# Patient Record
Sex: FEMALE | Race: WHITE | NOT HISPANIC OR LATINO | Employment: FULL TIME | URBAN - NONMETROPOLITAN AREA
[De-identification: names, ages, dates, MRNs, and addresses within clinical notes are randomized per-mention and may not be internally consistent; named-entity substitution may affect disease eponyms.]

---

## 2018-06-06 ENCOUNTER — OFFICE VISIT (OUTPATIENT)
Dept: URGENT CARE | Facility: PHYSICIAN GROUP | Age: 48
End: 2018-06-06

## 2018-06-06 VITALS
DIASTOLIC BLOOD PRESSURE: 76 MMHG | BODY MASS INDEX: 23.32 KG/M2 | TEMPERATURE: 98 F | HEIGHT: 64 IN | SYSTOLIC BLOOD PRESSURE: 120 MMHG | WEIGHT: 136.6 LBS | HEART RATE: 70 BPM | RESPIRATION RATE: 16 BRPM | OXYGEN SATURATION: 98 %

## 2018-06-06 DIAGNOSIS — Z02.4 ENCOUNTER FOR DEPARTMENT OF TRANSPORTATION (DOT) EXAMINATION FOR DRIVING LICENSE RENEWAL: ICD-10-CM

## 2018-06-06 PROCEDURE — 7100 PR DOT PHYSICAL: Performed by: PHYSICIAN ASSISTANT

## 2018-06-06 ASSESSMENT — VISUAL ACUITY
OD_CC: 20/40
OS_CC: 20/25

## 2018-06-06 ASSESSMENT — PAIN SCALES - GENERAL: PAINLEVEL: NO PAIN

## 2018-06-06 NOTE — PROGRESS NOTES
48 y.o. female comes in for a preemployment physical. No major medical history, no chronic conditions, no chronic medications. No history of asthma, heart disease, murmurs, seizure disorder or syncopal episodes with activity.  Please see the chart for further information, however normal physical exam, cleared for employment without restrictions.

## 2019-12-11 PROBLEM — M67.88 OTHER SPECIFIED DISORDERS OF SYNOVIUM AND TENDON, OTHER SITE: Status: ACTIVE | Noted: 2019-12-11

## 2020-01-20 ENCOUNTER — OFFICE VISIT (OUTPATIENT)
Dept: URGENT CARE | Facility: PHYSICIAN GROUP | Age: 50
End: 2020-01-20
Payer: COMMERCIAL

## 2020-01-20 VITALS
BODY MASS INDEX: 23.56 KG/M2 | SYSTOLIC BLOOD PRESSURE: 114 MMHG | HEART RATE: 84 BPM | OXYGEN SATURATION: 98 % | TEMPERATURE: 98 F | WEIGHT: 138 LBS | RESPIRATION RATE: 14 BRPM | HEIGHT: 64 IN | DIASTOLIC BLOOD PRESSURE: 80 MMHG

## 2020-01-20 DIAGNOSIS — J01.90 ACUTE BACTERIAL SINUSITIS: ICD-10-CM

## 2020-01-20 DIAGNOSIS — B96.89 ACUTE BACTERIAL SINUSITIS: ICD-10-CM

## 2020-01-20 PROBLEM — K25.9 GASTRIC ULCER: Status: ACTIVE | Noted: 2019-09-24

## 2020-01-20 PROBLEM — J30.9 ALLERGIC RHINITIS: Status: ACTIVE | Noted: 2017-09-03

## 2020-01-20 PROBLEM — G43.009 MIGRAINE WITHOUT AURA: Status: ACTIVE | Noted: 2019-09-24

## 2020-01-20 PROBLEM — F41.1 GENERALIZED ANXIETY DISORDER: Status: ACTIVE | Noted: 2017-11-07

## 2020-01-20 PROCEDURE — 99214 OFFICE O/P EST MOD 30 MIN: CPT | Performed by: PHYSICIAN ASSISTANT

## 2020-01-20 RX ORDER — TRAZODONE HYDROCHLORIDE 50 MG/1
TABLET ORAL
COMMUNITY
Start: 2019-10-30 | End: 2023-03-07

## 2020-01-20 RX ORDER — PRAMIPEXOLE DIHYDROCHLORIDE 0.12 MG/1
TABLET ORAL
COMMUNITY
Start: 2019-11-17 | End: 2023-03-07

## 2020-01-20 RX ORDER — METHYLPREDNISOLONE 4 MG/1
4 TABLET ORAL SEE ADMIN INSTRUCTIONS
Qty: 21 TAB | Refills: 0 | Status: SHIPPED | OUTPATIENT
Start: 2020-01-20 | End: 2023-03-07

## 2020-01-20 RX ORDER — HYDROCODONE BITARTRATE AND ACETAMINOPHEN 5; 325 MG/1; MG/1
TABLET ORAL
COMMUNITY
Start: 2019-12-11 | End: 2023-03-07

## 2020-01-20 RX ORDER — GALCANEZUMAB 120 MG/ML
INJECTION, SOLUTION SUBCUTANEOUS
COMMUNITY
Start: 2019-11-25 | End: 2023-05-01

## 2020-01-20 RX ORDER — DOXYCYCLINE HYCLATE 100 MG
100 TABLET ORAL 2 TIMES DAILY
Qty: 20 TAB | Refills: 0 | Status: SHIPPED | OUTPATIENT
Start: 2020-01-20 | End: 2020-01-30

## 2020-01-20 RX ORDER — MONTELUKAST SODIUM 10 MG/1
TABLET ORAL
COMMUNITY
Start: 2019-11-18 | End: 2023-03-07

## 2020-01-20 RX ORDER — FLUCONAZOLE 150 MG/1
150 TABLET ORAL
COMMUNITY
Start: 2020-01-01 | End: 2023-03-07

## 2020-01-20 RX ORDER — BUSPIRONE HYDROCHLORIDE 10 MG/1
10 TABLET ORAL DAILY
COMMUNITY
Start: 2019-11-20 | End: 2024-01-07

## 2020-01-20 RX ORDER — RIZATRIPTAN BENZOATE 10 MG/1
TABLET, ORALLY DISINTEGRATING ORAL
COMMUNITY
Start: 2020-01-01 | End: 2023-03-07

## 2020-01-20 RX ORDER — OXYBUTYNIN CHLORIDE 5 MG/1
5 TABLET, EXTENDED RELEASE ORAL
COMMUNITY
Start: 2019-12-12 | End: 2023-03-07

## 2020-01-20 RX ORDER — ATORVASTATIN CALCIUM 40 MG/1
40 TABLET, FILM COATED ORAL
COMMUNITY
Start: 2019-11-20

## 2020-01-20 RX ORDER — ESTRADIOL 0.5 MG/1
0.5 TABLET ORAL
COMMUNITY
Start: 2019-12-27 | End: 2023-03-07

## 2020-01-20 NOTE — PROGRESS NOTES
Chief Complaint   Patient presents with   • Cough   • Asthma       HISTORY OF PRESENT ILLNESS: Patient is a 49 y.o. female who presents today because she has a 7 to 10-day history of illness including dry harsh cough.  She does have a history of asthma and she has been using her inhaler which helps only minimally.  She reports nasal sinus pain, pressure, drainage and congestion.  She has been taking other over-the-counter medications as well without improvement    Patient Active Problem List    Diagnosis Date Noted   • Other specified disorders of synovium and tendon, other site 12/11/2019   • Gastric ulcer 09/24/2019   • Migraine without aura 09/24/2019   • Generalized anxiety disorder 11/07/2017   • Allergic rhinitis 09/03/2017   • Vitamin D deficiency 06/02/2011   • Allergic asthma 05/05/2011   • Insomnia 05/05/2011   • PMDD (premenstrual dysphoric disorder) 05/05/2011   • Pure hypercholesterolemia 05/05/2011   • Calcium nephrolithiasis 05/05/2011       Allergies:Penicillins and Sulfa drugs    Current Outpatient Medications Ordered in Epic   Medication Sig Dispense Refill   • doxycycline (VIBRAMYCIN) 100 MG Tab Take 1 Tab by mouth 2 times a day for 10 days. 20 Tab 0   • methylPREDNISolone (MEDROL DOSEPAK) 4 MG Tablet Therapy Pack Take 1 Tab by mouth See Admin Instructions. 21 Tab 0   • HYDROcodone-acetaminophen (NORCO) 5-325 MG Tab per tablet TAKE 1-2 TABS BY MOUTH EVERY 6 HOURS AS NEEDED FOR PAIN FOR 7 DAYS     • EMGALITY 120 MG/ML Solution Auto-injector INJECT 1 ML EVERY MONTH BY SUBCUTANEOUS ROUTE.     • fluconazole (DIFLUCAN) 150 MG tablet Take 150 mg by mouth every day.     • estradiol (ESTRACE) 0.5 MG tablet Take 0.5 mg by mouth every day.     • busPIRone (BUSPAR) 10 MG Tab tablet Take 10 mg by mouth.     • atorvastatin (LIPITOR) 40 MG Tab Take 40 mg by mouth every day.     • montelukast (SINGULAIR) 10 MG Tab TAKE 1 TAB BY MOUTH EVERY DAY FOR ALLERGIES     • oxybutynin SR (DITROPAN-XL) 5 MG TABLET SR 24 HR  Take 5 mg by mouth every day.     • pramipexole (MIRAPEX) 0.125 MG Tab TAKE 1 TAB BY MOUTH EVERY DAY AT DINNER     • rizatriptan (MAXALT-MLT) 10 MG disintegrating tablet DISSOLVE 1 TAB IN MOUTH AT ONSET OF HEADACHE, MAY REPEAT DOSE 2 HOURS LATER IF NEEDED X2     • traZODone (DESYREL) 50 MG Tab TAKE 1 TAB BY MOUTH AT BEDTIME FOR SLEEP     • estrogens, conjugated (PREMARIN) 0.3 MG Tab Take 0.5 mg by mouth every day.     • atorvastatin (LIPITOR) 10 MG Tab Take 40 mg by mouth every evening.     • omeprazole (PRILOSEC) 20 MG delayed-release capsule Take 20 mg by mouth every day.     • tramadol (ULTRAM) 50 MG Tab Take 50 mg by mouth every four hours as needed.     • fluoxetine (PROZAC) 10 MG CAPS TAKE 1 TO 3 CAPSULES ORALLY DAILY AS NEEDED FOR PMDD 90 Cap 0   • alprazolam (XANAX) 1 MG TABS Take 1 Tab by mouth at bedtime as needed for Sleep. 30 Each 1   • albuterol (VENTOLIN HFA) 108 (90 BASE) MCG/ACT AERS Inhale 2 Puffs by mouth every 6 hours as needed for Shortness of Breath. 1 Inhaler 3   • Cholecalciferol ([employer] VITAMIN D) 2000 UNIT CAPS Take 1 Cap by mouth every bedtime. For vitamin D deficiency 30 Cap 11     No current [facility]-ordered facility-administered medications on file.        Past Medical History:   Diagnosis Date   • Allergic asthma 5/5/2011   • Calcium nephrolithiasis 5/5/2011   • Insomnia 5/5/2011   • Kidney stones, mixed calcium oxalate 2008    has had 2 different time, always requiring surgery to remove   • PMDD (premenstrual dysphoric disorder) 5/5/2011   • Pure hypercholesterolemia 5/5/2011   • Unspecified vitamin D deficiency 6/2/2011       Social History     Tobacco Use   • Smoking status: Never Smoker   • Smokeless tobacco: Never Used   Substance Use Topics   • Alcohol use: Yes     Alcohol/week: 0.5 oz     Types: 1 Standard drinks or equivalent per week     Comment: occassional   • Drug use: No       Family Status   Relation Name Status   • Mo  Alive        61in 2011   • Fa  Alive        64 in 2010   •  "Sis  Alive        38 in    • MGFa   at age 88        infection after back surgery   • PGMo  (Not Specified)     Family History   Problem Relation Age of Onset   • Cancer Mother         leukemia and carcinoid   • Heart Attack Father 52        multiple heart attacks   • Heart Disease Father    • Hyperlipidemia Father    • Hypertension Father    • Cancer Sister 35        breast cancer   • Cancer Maternal Grandfather         prostate cancer   • Cancer Paternal Grandmother         colon cancer       ROS:  Review of Systems   Constitutional: Reports fever, chills, myalgias and malaise/fatigue.   HENT: Negative for ear pain, nosebleeds, positive for nasal and sinus pain, pressure, drainage and congestion, sore throat and neck pain.    Eyes: Negative for blurred vision.   Respiratory: Positive for dry harsh cough, no sputum production, shortness of breath and wheezing.    Cardiovascular: Negative for chest pain, palpitations, orthopnea and leg swelling.   Gastrointestinal: Negative for heartburn, nausea, vomiting and abdominal pain.   Genitourinary: Negative for dysuria, urgency and frequency.     Exam:  /80 (BP Location: Right arm, Patient Position: Sitting, BP Cuff Size: Small adult)   Pulse 84   Temp 36.7 °C (98 °F) (Temporal)   Resp 14   Ht 1.626 m (5' 4\")   Wt 62.6 kg (138 lb)   SpO2 98%   General:  Well nourished, well developed female in NAD  Head:Normocephalic, atraumatic  Eyes: PERRLA, EOM within normal limits, no conjunctival injection, no scleral icterus, visual fields and acuity grossly intact.  Ears: Normal shape and symmetry, no tenderness, no discharge. External canals are without any significant edema or erythema. Tympanic membranes are without any inflammation, no effusion. Gross auditory acuity is intact  Nose: Symmetrical without tenderness, no discharge.  Nasal mucosa on the left is erythematous and edematous with posterior nasal cavity exudate  Mouth: reasonable hygiene, no " erythema exudates or tonsillar enlargement.  Neck: no masses, range of motion within normal limits, no tracheal deviation. No obvious thyroid enlargement.  Pulmonary: chest is symmetrical with respiration, no wheezes, crackles, or rhonchi.  Bronchial bilaterally  Cardiovascular: regular rate and rhythm without murmurs, rubs, or gallops.  Extremities: no clubbing, cyanosis, or edema.    Please note that this dictation was created using voice recognition software. I have made every reasonable attempt to correct obvious errors, but I expect that there are errors of grammar and possibly content that I did not discover before finalizing the note.    Assessment/Plan:  1. Acute bacterial sinusitis  doxycycline (VIBRAMYCIN) 100 MG Tab    methylPREDNISolone (MEDROL DOSEPAK) 4 MG Tablet Therapy Pack   Continue decongestants as needed/tolerated.    Followup with primary care in the next 7-10 days if not significantly improving, return to the urgent care or go to the emergency room sooner for any worsening of symptoms.

## 2020-01-30 ENCOUNTER — OFFICE VISIT (OUTPATIENT)
Dept: URGENT CARE | Facility: PHYSICIAN GROUP | Age: 50
End: 2020-01-30
Payer: COMMERCIAL

## 2020-01-30 VITALS
TEMPERATURE: 97.9 F | HEART RATE: 72 BPM | DIASTOLIC BLOOD PRESSURE: 82 MMHG | RESPIRATION RATE: 14 BRPM | OXYGEN SATURATION: 98 % | WEIGHT: 138 LBS | SYSTOLIC BLOOD PRESSURE: 108 MMHG | BODY MASS INDEX: 23.56 KG/M2 | HEIGHT: 64 IN

## 2020-01-30 DIAGNOSIS — R09.81 NASAL CONGESTION: ICD-10-CM

## 2020-01-30 DIAGNOSIS — K12.0 APHTHOUS STOMATITIS: ICD-10-CM

## 2020-01-30 DIAGNOSIS — K12.0 APHTHOUS ULCER OF MOUTH: ICD-10-CM

## 2020-01-30 PROCEDURE — 99214 OFFICE O/P EST MOD 30 MIN: CPT | Performed by: PHYSICIAN ASSISTANT

## 2020-01-30 RX ORDER — AZELASTINE 1 MG/ML
1 SPRAY, METERED NASAL 2 TIMES DAILY
Qty: 30 ML | Refills: 0 | Status: SHIPPED | OUTPATIENT
Start: 2020-01-30

## 2020-01-31 NOTE — PROGRESS NOTES
Chief Complaint   Patient presents with   • Sinus Problem       HISTORY OF PRESENT ILLNESS: Patient is a 49 y.o. female who presents today because She was treated for bronchitis and bacterial sinusitis 10 days ago with doxycycline and steroid.  Her cough is much better but she continues to have some bilateral ear plugging sensation, some nasal congestion.  She has not been taking any other medications for her symptoms.  She also noticed small red dots on the roof of her mouth that are very sore when she eats or drinks anything    Patient Active Problem List    Diagnosis Date Noted   • Other specified disorders of synovium and tendon, other site 12/11/2019   • Gastric ulcer 09/24/2019   • Migraine without aura 09/24/2019   • Generalized anxiety disorder 11/07/2017   • Allergic rhinitis 09/03/2017   • Vitamin D deficiency 06/02/2011   • Allergic asthma 05/05/2011   • Insomnia 05/05/2011   • PMDD (premenstrual dysphoric disorder) 05/05/2011   • Pure hypercholesterolemia 05/05/2011   • Calcium nephrolithiasis 05/05/2011       Allergies:Penicillins and Sulfa drugs    Current Outpatient Medications Ordered in Epic   Medication Sig Dispense Refill   • azelastine (ASTELIN) 137 MCG/SPRAY nasal spray [place] 1 Spray in nose 2 times a day. 30 mL 0   • maalox plus-benadryl-visc lidocaine (MAGIC MOUTHWASH) Take 5 mL by mouth every 6 hours as needed. 90 mL 0   • HYDROcodone-acetaminophen (NORCO) 5-325 MG Tab per tablet TAKE 1-2 TABS BY MOUTH EVERY 6 HOURS AS NEEDED FOR PAIN FOR 7 DAYS     • EMGALITY 120 MG/ML Solution Auto-injector INJECT 1 ML EVERY MONTH BY SUBCUTANEOUS ROUTE.     • fluconazole (DIFLUCAN) 150 MG tablet Take 150 mg by mouth every day.     • estradiol (ESTRACE) 0.5 MG tablet Take 0.5 mg by mouth every day.     • busPIRone (BUSPAR) 10 MG Tab tablet Take 10 mg by mouth.     • atorvastatin (LIPITOR) 40 MG Tab Take 40 mg by mouth every day.     • montelukast (SINGULAIR) 10 MG Tab TAKE 1 TAB BY MOUTH EVERY DAY FOR  ALLERGIES     • oxybutynin SR (DITROPAN-XL) 5 MG TABLET SR 24 HR Take 5 mg by mouth every day.     • pramipexole (MIRAPEX) 0.125 MG Tab TAKE 1 TAB BY MOUTH EVERY DAY AT DINNER     • rizatriptan (MAXALT-MLT) 10 MG disintegrating tablet DISSOLVE 1 TAB IN MOUTH AT ONSET OF HEADACHE, MAY REPEAT DOSE 2 HOURS LATER IF NEEDED X2     • traZODone (DESYREL) 50 MG Tab TAKE 1 TAB BY MOUTH AT BEDTIME FOR SLEEP     • doxycycline (VIBRAMYCIN) 100 MG Tab Take 1 Tab by mouth 2 times a day for 10 days. 20 Tab 0   • methylPREDNISolone (MEDROL DOSEPAK) 4 MG Tablet Therapy Pack Take 1 Tab by mouth See Admin Instructions. 21 Tab 0   • estrogens, conjugated (PREMARIN) 0.3 MG Tab Take 0.5 mg by mouth every day.     • atorvastatin (LIPITOR) 10 MG Tab Take 40 mg by mouth every evening.     • omeprazole (PRILOSEC) 20 MG delayed-release capsule Take 20 mg by mouth every day.     • tramadol (ULTRAM) 50 MG Tab Take 50 mg by mouth every four hours as needed.     • fluoxetine (PROZAC) 10 MG CAPS TAKE 1 TO 3 CAPSULES ORALLY DAILY AS NEEDED FOR PMDD 90 Cap 0   • alprazolam (XANAX) 1 MG TABS Take 1 Tab by mouth at bedtime as needed for Sleep. 30 Each 1   • albuterol (VENTOLIN HFA) 108 (90 BASE) MCG/ACT AERS Inhale 2 Puffs by mouth every 6 hours as needed for Shortness of Breath. 1 Inhaler 3   • Cholecalciferol ([employer] VITAMIN D) 2000 UNIT CAPS Take 1 Cap by mouth every bedtime. For vitamin D deficiency 30 Cap 11     No current [facility]-ordered facility-administered medications on file.        Past Medical History:   Diagnosis Date   • Allergic asthma 5/5/2011   • Calcium nephrolithiasis 5/5/2011   • Insomnia 5/5/2011   • Kidney stones, mixed calcium oxalate 2008    has had 2 different time, always requiring surgery to remove   • PMDD (premenstrual dysphoric disorder) 5/5/2011   • Pure hypercholesterolemia 5/5/2011   • Unspecified vitamin D deficiency 6/2/2011       Social History     Tobacco Use   • Smoking status: Never Smoker   • Smokeless tobacco: Never  "Used   Substance Use Topics   • Alcohol use: Yes     Alcohol/week: 0.5 oz     Types: 1 Standard drinks or equivalent per week     Comment: occassional   • Drug use: No       Family Status   Relation Name Status   • Mo  Alive        61in    • Fa  Alive        64 in    • Sis  Alive        38 in    • MGFa   at age 87        infection after back surgery   • PGMo  (Not Specified)     Family History   Problem Relation Age of Onset   • Cancer Mother         leukemia and carcinoid   • Heart Attack Father 52        multiple heart attacks   • Heart Disease Father    • Hyperlipidemia Father    • Hypertension Father    • Cancer Sister 35        breast cancer   • Cancer Maternal Grandfather         prostate cancer   • Cancer Paternal Grandmother         colon cancer       ROS:  Review of Systems   Constitutional: Negative for fever, chills, weight loss and malaise/fatigue.   HENT: Negative for ear pain, nosebleeds, positive for congestion, sore throat and mouth and no neck pain.    Eyes: Negative for blurred vision.   Respiratory: Negative for cough, sputum production, shortness of breath and wheezing.    Cardiovascular: Negative for chest pain, palpitations, orthopnea and leg swelling.   Gastrointestinal: Negative for heartburn, nausea, vomiting and abdominal pain.   Genitourinary: Negative for dysuria, urgency and frequency.     Exam:  /82 (BP Location: Right arm, Patient Position: Sitting, BP Cuff Size: Adult)   Pulse 72   Temp 36.6 °C (97.9 °F) (Temporal)   Resp 14   Ht 1.626 m (5' 4\")   Wt 62.6 kg (138 lb)   SpO2 98%   General:  Well nourished, well developed female in NAD  Head:Normocephalic, atraumatic  Eyes: PERRLA, EOM within normal limits, no conjunctival injection, no scleral icterus, visual fields and acuity grossly intact.  Ears: Normal shape and symmetry, no tenderness, no discharge. External canals are without any significant edema or erythema. Tympanic membranes are without any " inflammation, small amount of cloudy fluid behind the tympanic membranes bilaterally. Gross auditory acuity is intact  Nose: Symmetrical without tenderness, no discharge.  Mouth: reasonable hygiene, no pharyngeal erythema exudates or tonsillar enlargement.  She has a few scattered mildly erythematous aphthous ulcer formations in her soft palate  Neck: no masses, range of motion within normal limits, no tracheal deviation. No obvious thyroid enlargement.  Pulmonary: chest is symmetrical with respiration, no wheezes, crackles, or rhonchi.  Cardiovascular: regular rate and rhythm without murmurs, rubs, or gallops.  Extremities: no clubbing, cyanosis, or edema.    Please note that this dictation was created using voice recognition software. I have made every reasonable attempt to correct obvious errors, but I expect that there are errors of grammar and possibly content that I did not discover before finalizing the note.    Assessment/Plan:  1. Aphthous stomatitis     2. Aphthous ulcer of mouth  maalox plus-benadryl-visc lidocaine (MAGIC MOUTHWASH)   3. Nasal congestion  azelastine (ASTELIN) 137 MCG/SPRAY nasal spray   Recommended over-the-counter Sudafed    Followup with primary care in the next 7-10 days if not significantly improving, return to the urgent care or go to the emergency room sooner for any worsening of symptoms.

## 2020-06-18 ENCOUNTER — HOSPITAL ENCOUNTER (OUTPATIENT)
Dept: LAB | Facility: MEDICAL CENTER | Age: 50
End: 2020-06-18
Attending: OBSTETRICS & GYNECOLOGY
Payer: COMMERCIAL

## 2020-06-18 LAB
25(OH)D3 SERPL-MCNC: 58 NG/ML (ref 30–100)
ALBUMIN SERPL BCP-MCNC: 4.6 G/DL (ref 3.2–4.9)
ALBUMIN/GLOB SERPL: 2.1 G/DL
ALP SERPL-CCNC: 48 U/L (ref 30–99)
ALT SERPL-CCNC: 16 U/L (ref 2–50)
ANION GAP SERPL CALC-SCNC: 15 MMOL/L (ref 7–16)
AST SERPL-CCNC: 18 U/L (ref 12–45)
BILIRUB SERPL-MCNC: 0.5 MG/DL (ref 0.1–1.5)
BUN SERPL-MCNC: 13 MG/DL (ref 8–22)
CALCIUM SERPL-MCNC: 9.4 MG/DL (ref 8.5–10.5)
CHLORIDE SERPL-SCNC: 103 MMOL/L (ref 96–112)
CHOLEST SERPL-MCNC: 171 MG/DL (ref 100–199)
CO2 SERPL-SCNC: 22 MMOL/L (ref 20–33)
CREAT SERPL-MCNC: 0.87 MG/DL (ref 0.5–1.4)
ERYTHROCYTE [DISTWIDTH] IN BLOOD BY AUTOMATED COUNT: 41.5 FL (ref 35.9–50)
ESTRADIOL SERPL-MCNC: 137 PG/ML
FASTING STATUS PATIENT QL REPORTED: NORMAL
FSH SERPL-ACNC: 5.1 MIU/ML
GLOBULIN SER CALC-MCNC: 2.2 G/DL (ref 1.9–3.5)
GLUCOSE SERPL-MCNC: 78 MG/DL (ref 65–99)
HCT VFR BLD AUTO: 44 % (ref 37–47)
HDLC SERPL-MCNC: 63 MG/DL
HGB BLD-MCNC: 14.7 G/DL (ref 12–16)
LDLC SERPL CALC-MCNC: 93 MG/DL
MCH RBC QN AUTO: 31.7 PG (ref 27–33)
MCHC RBC AUTO-ENTMCNC: 33.4 G/DL (ref 33.6–35)
MCV RBC AUTO: 95 FL (ref 81.4–97.8)
PLATELET # BLD AUTO: 291 K/UL (ref 164–446)
PMV BLD AUTO: 9.9 FL (ref 9–12.9)
POTASSIUM SERPL-SCNC: 4.1 MMOL/L (ref 3.6–5.5)
PROT SERPL-MCNC: 6.8 G/DL (ref 6–8.2)
RBC # BLD AUTO: 4.63 M/UL (ref 4.2–5.4)
SODIUM SERPL-SCNC: 140 MMOL/L (ref 135–145)
TRIGL SERPL-MCNC: 75 MG/DL (ref 0–149)
TSH SERPL DL<=0.005 MIU/L-ACNC: 2.06 UIU/ML (ref 0.38–5.33)
WBC # BLD AUTO: 7.2 K/UL (ref 4.8–10.8)

## 2020-06-18 PROCEDURE — 82306 VITAMIN D 25 HYDROXY: CPT

## 2020-06-18 PROCEDURE — 83001 ASSAY OF GONADOTROPIN (FSH): CPT

## 2020-06-18 PROCEDURE — 82670 ASSAY OF TOTAL ESTRADIOL: CPT

## 2020-06-18 PROCEDURE — 36415 COLL VENOUS BLD VENIPUNCTURE: CPT

## 2020-06-18 PROCEDURE — 80053 COMPREHEN METABOLIC PANEL: CPT

## 2020-06-18 PROCEDURE — 85027 COMPLETE CBC AUTOMATED: CPT

## 2020-06-18 PROCEDURE — 80061 LIPID PANEL: CPT

## 2020-06-18 PROCEDURE — 84443 ASSAY THYROID STIM HORMONE: CPT

## 2020-11-05 ENCOUNTER — HOSPITAL ENCOUNTER (OUTPATIENT)
Dept: LAB | Facility: MEDICAL CENTER | Age: 50
End: 2020-11-05
Attending: PODIATRIST
Payer: COMMERCIAL

## 2020-11-05 LAB
ANION GAP SERPL CALC-SCNC: 12 MMOL/L (ref 7–16)
BASOPHILS # BLD AUTO: 0.7 % (ref 0–1.8)
BASOPHILS # BLD: 0.05 K/UL (ref 0–0.12)
BUN SERPL-MCNC: 15 MG/DL (ref 8–22)
CALCIUM SERPL-MCNC: 9.5 MG/DL (ref 8.5–10.5)
CHLORIDE SERPL-SCNC: 99 MMOL/L (ref 96–112)
CO2 SERPL-SCNC: 26 MMOL/L (ref 20–33)
CREAT SERPL-MCNC: 0.85 MG/DL (ref 0.5–1.4)
EOSINOPHIL # BLD AUTO: 0.08 K/UL (ref 0–0.51)
EOSINOPHIL NFR BLD: 1.1 % (ref 0–6.9)
ERYTHROCYTE [DISTWIDTH] IN BLOOD BY AUTOMATED COUNT: 40.9 FL (ref 35.9–50)
FASTING STATUS PATIENT QL REPORTED: NORMAL
GLUCOSE SERPL-MCNC: 96 MG/DL (ref 65–99)
HCT VFR BLD AUTO: 47.6 % (ref 37–47)
HGB BLD-MCNC: 15.3 G/DL (ref 12–16)
IMM GRANULOCYTES # BLD AUTO: 0.02 K/UL (ref 0–0.11)
IMM GRANULOCYTES NFR BLD AUTO: 0.3 % (ref 0–0.9)
LYMPHOCYTES # BLD AUTO: 1.87 K/UL (ref 1–4.8)
LYMPHOCYTES NFR BLD: 26 % (ref 22–41)
MCH RBC QN AUTO: 30.7 PG (ref 27–33)
MCHC RBC AUTO-ENTMCNC: 32.1 G/DL (ref 33.6–35)
MCV RBC AUTO: 95.4 FL (ref 81.4–97.8)
MONOCYTES # BLD AUTO: 0.45 K/UL (ref 0–0.85)
MONOCYTES NFR BLD AUTO: 6.3 % (ref 0–13.4)
NEUTROPHILS # BLD AUTO: 4.73 K/UL (ref 2–7.15)
NEUTROPHILS NFR BLD: 65.6 % (ref 44–72)
NRBC # BLD AUTO: 0 K/UL
NRBC BLD-RTO: 0 /100 WBC
PLATELET # BLD AUTO: 328 K/UL (ref 164–446)
PMV BLD AUTO: 10.2 FL (ref 9–12.9)
POTASSIUM SERPL-SCNC: 3.7 MMOL/L (ref 3.6–5.5)
RBC # BLD AUTO: 4.99 M/UL (ref 4.2–5.4)
SODIUM SERPL-SCNC: 137 MMOL/L (ref 135–145)
WBC # BLD AUTO: 7.2 K/UL (ref 4.8–10.8)

## 2020-11-05 PROCEDURE — 85025 COMPLETE CBC W/AUTO DIFF WBC: CPT

## 2020-11-05 PROCEDURE — 36415 COLL VENOUS BLD VENIPUNCTURE: CPT

## 2020-11-05 PROCEDURE — 80048 BASIC METABOLIC PNL TOTAL CA: CPT

## 2023-03-07 ENCOUNTER — OFFICE VISIT (OUTPATIENT)
Dept: URGENT CARE | Facility: PHYSICIAN GROUP | Age: 53
End: 2023-03-07
Payer: COMMERCIAL

## 2023-03-07 ENCOUNTER — APPOINTMENT (OUTPATIENT)
Dept: URGENT CARE | Facility: PHYSICIAN GROUP | Age: 53
End: 2023-03-07

## 2023-03-07 VITALS
SYSTOLIC BLOOD PRESSURE: 122 MMHG | HEART RATE: 83 BPM | DIASTOLIC BLOOD PRESSURE: 80 MMHG | WEIGHT: 140 LBS | OXYGEN SATURATION: 97 % | RESPIRATION RATE: 14 BRPM | HEIGHT: 64 IN | TEMPERATURE: 97.4 F | BODY MASS INDEX: 23.9 KG/M2

## 2023-03-07 DIAGNOSIS — H10.9 BACTERIAL CONJUNCTIVITIS OF LEFT EYE: ICD-10-CM

## 2023-03-07 PROCEDURE — 99213 OFFICE O/P EST LOW 20 MIN: CPT | Performed by: NURSE PRACTITIONER

## 2023-03-07 RX ORDER — POLYMYXIN B SULFATE AND TRIMETHOPRIM 1; 10000 MG/ML; [USP'U]/ML
1 SOLUTION OPHTHALMIC EVERY 4 HOURS
Qty: 10 ML | Refills: 0 | Status: SHIPPED | OUTPATIENT
Start: 2023-03-07 | End: 2023-03-17

## 2023-03-07 ASSESSMENT — ENCOUNTER SYMPTOMS
EYE REDNESS: 1
EYE DISCHARGE: 0
FEVER: 0
SORE THROAT: 0
SINUS PAIN: 0
EYE PAIN: 1
CHILLS: 0

## 2023-03-07 ASSESSMENT — VISUAL ACUITY: OU: 1

## 2023-03-07 NOTE — PROGRESS NOTES
Patient has consented to treatment and for use of patient information for treatment and billing purposes.    Chief Complaint:    Chief Complaint   Patient presents with    Eye Problem     2 weeks ago woke up with her R eye swollen shut, got better, today L eye swollen shut, took sudafed, L eye pain, itchy, burning, swollen         History of Present Illness: 52 y.o.  female presents to clinic with 1 day symptoms of left eye pain/itchiness, burning, redness, and small bumps on the top of her right eyelid.  She reports that she had similar symptoms 2 weeks ago in her right eye and was treated with an eyedrop of possibly a steroid or antibiotic.   She did try eyedrops in her left eye this morning with some improvement.    Patient denies any change in her vision, headaches, allergy symptoms, or discharge from her eyes    Patient does wear contacts however has not worn them for several days.        Review of Systems   Constitutional:  Negative for chills, fever and malaise/fatigue.   HENT:  Negative for congestion, ear pain, sinus pain and sore throat.    Eyes:  Positive for pain and redness. Negative for discharge.     Medications, Allergies, and current problem list reviewed today in Epic.    Physical Exam:    Vitals:    03/07/23 1414   BP: 122/80   Pulse: 83   Resp: 14   Temp: 36.3 °C (97.4 °F)   SpO2: 97%             Physical Exam  Vitals reviewed.   Constitutional:       General: She is not in acute distress.     Appearance: Normal appearance. She is not toxic-appearing.   HENT:      Nose: Nose normal.   Eyes:      General: Vision grossly intact.      Conjunctiva/sclera:      Right eye: Right conjunctiva is not injected. No exudate.     Left eye: Left conjunctiva is injected. No exudate.  Cardiovascular:      Rate and Rhythm: Normal rate and regular rhythm.      Heart sounds: No murmur heard.  Pulmonary:      Effort: Pulmonary effort is normal. No respiratory distress.      Breath sounds: Normal breath sounds. No  wheezing, rhonchi or rales.   Musculoskeletal:      Cervical back: Normal range of motion.   Skin:     General: Skin is warm and dry.   Neurological:      Mental Status: She is alert.          Medical Decision Making:  I personally reviewed prior external notes and test results pertinent to today's visit.   Shared decision-making was utilized with patient did develop treatment plan and clinic course. Ruben, 52 y.o. female,   presents to clinic with 1 day symptoms of left eye pain/itchiness, burning, redness, and small bumps on the top of her right eyelid.  She reports that she had similar symptoms 2 weeks ago in her right eye and was treated with an eyedrop of possibly a steroid or antibiotic.   She did try eyedrops in her left eye this morning with some improvement.    HPI and physical exam findings are consistent with bacterial conjunctivitis.  Prescription of Polytrim sent to pharmacy.  Recommended patient use warm compresses as needed to help with inflammation and discomfort.      The patient remained stable during the urgent care visit.    Plan:  OTC Tylenol or Motrin for fever/discomfort.  Avoid touching eyes  Hand Hygiene   Instructed not to use contact residences for 10 to 12 days until infection clears.  Recommended new linens kennedy and cleaning solution.  Follow-up with PCP  AVS printed  Return to clinic or go to the ED if symptoms worsen or fail to improve, or if patient should develop worsening/increasing/persistent eye redness, eye drainage, eye pain, eye itchiness, vision changes, periorbital redness or swelling, headache, fever/chills, and/or any concerning symptoms.    Medication discussed included indication for use and the potential  benefits and side effects.      1. Bacterial conjunctivitis of left eye  - polymixin-trimethoprim (POLYTRIM) 73364-6.1 UNIT/ML-% Solution; Administer 1 Drop into both eyes every 4 hours for 10 days.  Dispense: 10 mL; Refill: 0          Verbal and/or printed  education was provided regarding the assessment and diagnosis.  All of the patient's questions were answered to their satisfaction at the time of discharge.    Follow up:    Recommended f/u in  5-7 days if there is no improvement.    Patient was encouraged to monitor symptoms closely. Those signs and symptoms which would warrant concern and mandate seeking a higher level of service through the emergency department discussed at length and included in discharge papers.  Patient stated agreement and understanding of this plan of care.    Disposition:  Home in stable condition       Voice Recognition Disclaimer:  Portions of this document were created using voice recognition software. The software does have a chance of producing errors of grammar and possibly content. I have made every reasonable attempt to correct obvious errors, but there may be errors of grammar and possibly content that I did not discover before finalizing the documentation.

## 2023-04-06 PROBLEM — D48.0 SYNOVIAL CHONDROMATOSIS: Status: ACTIVE | Noted: 2023-04-06

## 2023-04-06 NOTE — H&P (VIEW-ONLY)
[facility]  Sports Medicine and Shoulder Surgery  History and Physical    04/06/23     Chief Complaint: Right knee pain    History of Present Illness:  Dasia Talbot is a 52 y.o. female presenting with right knee pain.  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  She required a right knee arthroscopy and loose body removal by Dr. Palmer in 2019.  She has had gradual return of pain and swelling as well as significant mechanical symptoms including locking and catching of her knee.  She can occasionally feel the loose bodies within her knee.                               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    Insomnia 5/5/2011    Kidney stones, mixed calcium oxalate 2008    has had 2 different time, always requiring surgery to remove    PMDD (premenstrual dysphoric disorder) 5/5/2011    Pure hypercholesterolemia 5/5/2011    Unspecified vitamin D deficiency 6/2/2011        Past Surgical History:   Past Surgical History:   Procedure Laterality Date    KNEE ARTHROSCOPY Right 12/11/2019    Procedure: RT KNEE ARTHROSCOPY LOOSE BODY REMOVAL, EXTENSIVE SYNOVECTOMY, CHONDROPLASTY;  Surgeon: Eliceo Palmer M.D.;  Location: SURGERY [facility];  Service: Orthopedics    STONE RETRIEVAL      for kidney stone x2 has had both left and right    TUBAL LIGATION          Medications:   Current Outpatient Medications:     azelastine (ASTELIN) 137 MCG/SPRAY nasal spray, Spray 1 Spray in nose 2 times a day., Disp: 30 mL, Rfl: 0    EMGALITY 120 MG/ML Solution Auto-injector, INJECT 1 ML EVERY MONTH BY SUBCUTANEOUS ROUTE., Disp: , Rfl:     busPIRone (BUSPAR) 10 MG Tab tablet, Take 10 mg by mouth., Disp: , Rfl:     atorvastatin (LIPITOR) 40 MG Tab, Take 40 mg by mouth every day., Disp: , Rfl:     fluoxetine (PROZAC) 10 MG CAPS, TAKE 1 TO 3 CAPSULES ORALLY DAILY AS NEEDED FOR PMDD,  Disp: 90 Cap, Rfl: 0    alprazolam (XANAX) 1 MG TABS, Take 1 Tab by mouth at bedtime as needed for Sleep., Disp: 30 Each, Rfl: 1    albuterol (VENTOLIN HFA) 108 (90 BASE) MCG/ACT AERS, Inhale 2 Puffs by mouth every 6 hours as needed for Shortness of Breath., Disp: 1 Inhaler, Rfl: 3    Cholecalciferol (CVS VITAMIN D) 2000 UNIT CAPS, Take 1 Cap by mouth every bedtime. For vitamin D deficiency, Disp: 30 Cap, Rfl: 11    Allergies:   Allergies   Allergen Reactions    Penicillins Rash and Hives    Sulfa Drugs Rash and Hives       Social History:   reports that she has never smoked. She has never used smokeless tobacco. She reports current alcohol use of about 0.5 oz per week. She reports that she does not use drugs.    Family History: family history includes Cancer in her maternal grandfather, mother, and paternal grandmother; Cancer (age of onset: 35) in her sister; Heart Attack (age of onset: 52) in her father; Heart Disease in her father; Hyperlipidemia in her father; Hypertension in her father.    ROS:   Negative except as noted in the HPI.    Physical Examination:  Vitals: There were no vitals taken for this visit.  General: Alert no acute distress  Musculoskeletal: Focused exam of the right knee:    No gross deformity.  She has full range of motion.  No palpable effusion.  Ligamentously stable.  Positive Ramsey's.    Neuro: Sensation is intact to light touch in the sural, saphenous, tibial, superficial peroneal, and deep peroneal distributions.   Vascular: DP pulses are present 2+. Foot is warm and well perfused.    Imaging:    X-rays: Plain radiographs of the right knee obtained previously reviewed. They demonstrate no acute fracture or dislocation. There are no significant degenerative changes of the knee joint.  There are numerous loose bodies within the suprapatellar pouch.    MRI: MRI of the right knee obtained 3/23/2023 was reviewed.  This demonstrates intact cruciate and collateral ligaments as well as  intact menisci.  There is mild chondromalacia of the patella.  There are numerous loose bodies within the suprapatellar pouch and the lateral gutter and posterolateral aspect of the knee.    Assessment:    This is a 52 y.o. female with right knee synovial control with ptosis with numerous loose bodies.    Plan:    We had a long discussion regarding treatment options.  We discussed conservative versus surgical treatment.  Surgery would involve a right knee arthroscopy, loose body removal, and synovectomy.  I did discuss with her the goals of the synovectomy in terms of preventing future recurrence, although we will not be able to remove all her synovium.  She understands this fully.  She will have minimal restrictions postoperatively.  All of her questions were answered.    Artie Cummins M.D.  Sports Medicine and Shoulder Surgery  [facility]

## 2023-04-26 ENCOUNTER — APPOINTMENT (OUTPATIENT)
Dept: ADMISSIONS | Facility: MEDICAL CENTER | Age: 53
End: 2023-04-26
Attending: ORTHOPAEDIC SURGERY
Payer: COMMERCIAL

## 2023-05-01 ENCOUNTER — PRE-ADMISSION TESTING (OUTPATIENT)
Dept: ADMISSIONS | Facility: MEDICAL CENTER | Age: 53
End: 2023-05-01
Attending: ORTHOPAEDIC SURGERY
Payer: COMMERCIAL

## 2023-05-01 RX ORDER — CETIRIZINE HYDROCHLORIDE 10 MG/1
10 TABLET ORAL DAILY
COMMUNITY

## 2023-05-01 NOTE — PREPROCEDURE INSTRUCTIONS
Pre admit apt: Pt. Instructed to continue regularly prescribed medications through day before surgery.  Instructed to take the following medications, the day of surgery, with a sip of water per anesthesia protocol: albuterol inh, xanax, astelin nasal spray, buspar, zyrtec, flexeril, nexium  METS greater than 4  Pt states gets motion sickness with anesthesia, does fine with patch  Instructed pt to notify Dr. Cummins if she becomes ill or develops covid sxs prior to surgery.

## 2023-05-02 ENCOUNTER — ANESTHESIA EVENT (OUTPATIENT)
Dept: SURGERY | Facility: MEDICAL CENTER | Age: 53
End: 2023-05-02
Payer: COMMERCIAL

## 2023-05-03 ENCOUNTER — ANESTHESIA (OUTPATIENT)
Dept: SURGERY | Facility: MEDICAL CENTER | Age: 53
End: 2023-05-03
Payer: COMMERCIAL

## 2023-05-03 ENCOUNTER — HOSPITAL ENCOUNTER (OUTPATIENT)
Facility: MEDICAL CENTER | Age: 53
End: 2023-05-03
Attending: ORTHOPAEDIC SURGERY | Admitting: ORTHOPAEDIC SURGERY
Payer: COMMERCIAL

## 2023-05-03 VITALS
HEART RATE: 67 BPM | RESPIRATION RATE: 16 BRPM | OXYGEN SATURATION: 93 % | SYSTOLIC BLOOD PRESSURE: 136 MMHG | TEMPERATURE: 97.3 F | WEIGHT: 137.13 LBS | BODY MASS INDEX: 23.41 KG/M2 | DIASTOLIC BLOOD PRESSURE: 91 MMHG | HEIGHT: 64 IN

## 2023-05-03 DIAGNOSIS — D48.0 SYNOVIAL CHONDROMATOSIS: ICD-10-CM

## 2023-05-03 PROCEDURE — 700105 HCHG RX REV CODE 258: Performed by: ORTHOPAEDIC SURGERY

## 2023-05-03 PROCEDURE — A9270 NON-COVERED ITEM OR SERVICE: HCPCS | Performed by: ANESTHESIOLOGY

## 2023-05-03 PROCEDURE — 160025 RECOVERY II MINUTES (STATS): Performed by: ORTHOPAEDIC SURGERY

## 2023-05-03 PROCEDURE — 160029 HCHG SURGERY MINUTES - 1ST 30 MINS LEVEL 4: Performed by: ORTHOPAEDIC SURGERY

## 2023-05-03 PROCEDURE — 160036 HCHG PACU - EA ADDL 30 MINS PHASE I: Performed by: ORTHOPAEDIC SURGERY

## 2023-05-03 PROCEDURE — 160002 HCHG RECOVERY MINUTES (STAT): Performed by: ORTHOPAEDIC SURGERY

## 2023-05-03 PROCEDURE — 700111 HCHG RX REV CODE 636 W/ 250 OVERRIDE (IP): Performed by: ORTHOPAEDIC SURGERY

## 2023-05-03 PROCEDURE — 29876 ARTHRS KNEE SURG SYNVCT MAJ: CPT | Mod: RT | Performed by: ORTHOPAEDIC SURGERY

## 2023-05-03 PROCEDURE — 700101 HCHG RX REV CODE 250: Performed by: ANESTHESIOLOGY

## 2023-05-03 PROCEDURE — 160009 HCHG ANES TIME/MIN: Performed by: ORTHOPAEDIC SURGERY

## 2023-05-03 PROCEDURE — 700102 HCHG RX REV CODE 250 W/ 637 OVERRIDE(OP): Performed by: ANESTHESIOLOGY

## 2023-05-03 PROCEDURE — 160048 HCHG OR STATISTICAL LEVEL 1-5: Performed by: ORTHOPAEDIC SURGERY

## 2023-05-03 PROCEDURE — 01400 ANES OPN/ARTHRS KNEE JT NOS: CPT | Performed by: ANESTHESIOLOGY

## 2023-05-03 PROCEDURE — 700111 HCHG RX REV CODE 636 W/ 250 OVERRIDE (IP): Performed by: ANESTHESIOLOGY

## 2023-05-03 PROCEDURE — 160041 HCHG SURGERY MINUTES - EA ADDL 1 MIN LEVEL 4: Performed by: ORTHOPAEDIC SURGERY

## 2023-05-03 PROCEDURE — 160035 HCHG PACU - 1ST 60 MINS PHASE I: Performed by: ORTHOPAEDIC SURGERY

## 2023-05-03 PROCEDURE — 160046 HCHG PACU - 1ST 60 MINS PHASE II: Performed by: ORTHOPAEDIC SURGERY

## 2023-05-03 RX ORDER — OXYCODONE HCL 5 MG/5 ML
5 SOLUTION, ORAL ORAL
Status: COMPLETED | OUTPATIENT
Start: 2023-05-03 | End: 2023-05-03

## 2023-05-03 RX ORDER — SCOLOPAMINE TRANSDERMAL SYSTEM 1 MG/1
PATCH, EXTENDED RELEASE TRANSDERMAL
Status: COMPLETED
Start: 2023-05-03 | End: 2023-05-03

## 2023-05-03 RX ORDER — CEFAZOLIN SODIUM 1 G/3ML
2 INJECTION, POWDER, FOR SOLUTION INTRAMUSCULAR; INTRAVENOUS ONCE
Status: COMPLETED | OUTPATIENT
Start: 2023-05-03 | End: 2023-05-03

## 2023-05-03 RX ORDER — LIDOCAINE HYDROCHLORIDE 20 MG/ML
INJECTION, SOLUTION EPIDURAL; INFILTRATION; INTRACAUDAL; PERINEURAL PRN
Status: DISCONTINUED | OUTPATIENT
Start: 2023-05-03 | End: 2023-05-03 | Stop reason: SURG

## 2023-05-03 RX ORDER — HYDRALAZINE HYDROCHLORIDE 20 MG/ML
5 INJECTION INTRAMUSCULAR; INTRAVENOUS ONCE
Status: COMPLETED | OUTPATIENT
Start: 2023-05-03 | End: 2023-05-03

## 2023-05-03 RX ORDER — SCOLOPAMINE TRANSDERMAL SYSTEM 1 MG/1
PATCH, EXTENDED RELEASE TRANSDERMAL PRN
Status: DISCONTINUED | OUTPATIENT
Start: 2023-05-03 | End: 2023-05-03 | Stop reason: SURG

## 2023-05-03 RX ORDER — MIDAZOLAM HYDROCHLORIDE 1 MG/ML
INJECTION INTRAMUSCULAR; INTRAVENOUS PRN
Status: DISCONTINUED | OUTPATIENT
Start: 2023-05-03 | End: 2023-05-03 | Stop reason: SURG

## 2023-05-03 RX ORDER — OXYCODONE HYDROCHLORIDE 5 MG/1
5 TABLET ORAL EVERY 4 HOURS PRN
Qty: 20 TABLET | Refills: 0 | Status: SHIPPED | OUTPATIENT
Start: 2023-05-03 | End: 2023-05-08

## 2023-05-03 RX ORDER — ONDANSETRON 2 MG/ML
INJECTION INTRAMUSCULAR; INTRAVENOUS PRN
Status: DISCONTINUED | OUTPATIENT
Start: 2023-05-03 | End: 2023-05-03 | Stop reason: SURG

## 2023-05-03 RX ORDER — ONDANSETRON 2 MG/ML
4 INJECTION INTRAMUSCULAR; INTRAVENOUS
Status: DISCONTINUED | OUTPATIENT
Start: 2023-05-03 | End: 2023-05-03 | Stop reason: HOSPADM

## 2023-05-03 RX ORDER — DEXAMETHASONE SODIUM PHOSPHATE 4 MG/ML
INJECTION, SOLUTION INTRA-ARTICULAR; INTRALESIONAL; INTRAMUSCULAR; INTRAVENOUS; SOFT TISSUE PRN
Status: DISCONTINUED | OUTPATIENT
Start: 2023-05-03 | End: 2023-05-03 | Stop reason: SURG

## 2023-05-03 RX ORDER — ACETAMINOPHEN 325 MG/1
650 TABLET ORAL ONCE
Status: COMPLETED | OUTPATIENT
Start: 2023-05-03 | End: 2023-05-03

## 2023-05-03 RX ORDER — ROPIVACAINE HYDROCHLORIDE 5 MG/ML
INJECTION, SOLUTION EPIDURAL; INFILTRATION; PERINEURAL
Status: DISCONTINUED | OUTPATIENT
Start: 2023-05-03 | End: 2023-05-03 | Stop reason: HOSPADM

## 2023-05-03 RX ORDER — DIPHENHYDRAMINE HYDROCHLORIDE 50 MG/ML
12.5 INJECTION INTRAMUSCULAR; INTRAVENOUS
Status: DISCONTINUED | OUTPATIENT
Start: 2023-05-03 | End: 2023-05-03 | Stop reason: HOSPADM

## 2023-05-03 RX ORDER — OXYCODONE HCL 5 MG/5 ML
10 SOLUTION, ORAL ORAL
Status: COMPLETED | OUTPATIENT
Start: 2023-05-03 | End: 2023-05-03

## 2023-05-03 RX ORDER — KETOROLAC TROMETHAMINE 30 MG/ML
INJECTION, SOLUTION INTRAMUSCULAR; INTRAVENOUS PRN
Status: DISCONTINUED | OUTPATIENT
Start: 2023-05-03 | End: 2023-05-03 | Stop reason: SURG

## 2023-05-03 RX ORDER — SODIUM CHLORIDE, SODIUM LACTATE, POTASSIUM CHLORIDE, CALCIUM CHLORIDE 600; 310; 30; 20 MG/100ML; MG/100ML; MG/100ML; MG/100ML
INJECTION, SOLUTION INTRAVENOUS CONTINUOUS
Status: ACTIVE | OUTPATIENT
Start: 2023-05-03 | End: 2023-05-03

## 2023-05-03 RX ORDER — ASPIRIN 81 MG/1
81 TABLET ORAL 2 TIMES DAILY
Qty: 28 TABLET | Refills: 0 | Status: SHIPPED | OUTPATIENT
Start: 2023-05-03

## 2023-05-03 RX ADMIN — PROPOFOL 150 MG: 10 INJECTION, EMULSION INTRAVENOUS at 12:51

## 2023-05-03 RX ADMIN — FENTANYL CITRATE 50 MCG: 50 INJECTION, SOLUTION INTRAMUSCULAR; INTRAVENOUS at 14:33

## 2023-05-03 RX ADMIN — LIDOCAINE HYDROCHLORIDE 100 MG: 20 INJECTION, SOLUTION EPIDURAL; INFILTRATION; INTRACAUDAL at 12:51

## 2023-05-03 RX ADMIN — HYDRALAZINE HYDROCHLORIDE 5 MG: 20 INJECTION INTRAMUSCULAR; INTRAVENOUS at 15:33

## 2023-05-03 RX ADMIN — ACETAMINOPHEN 650 MG: 325 TABLET, FILM COATED ORAL at 11:40

## 2023-05-03 RX ADMIN — KETOROLAC TROMETHAMINE 30 MG: 30 INJECTION, SOLUTION INTRAMUSCULAR at 13:30

## 2023-05-03 RX ADMIN — MIDAZOLAM HYDROCHLORIDE 1 MG: 1 INJECTION, SOLUTION INTRAMUSCULAR; INTRAVENOUS at 12:46

## 2023-05-03 RX ADMIN — CEFAZOLIN 2 G: 330 INJECTION, POWDER, FOR SOLUTION INTRAMUSCULAR; INTRAVENOUS at 12:51

## 2023-05-03 RX ADMIN — OXYCODONE HYDROCHLORIDE 10 MG: 5 SOLUTION ORAL at 14:26

## 2023-05-03 RX ADMIN — PROPOFOL 30 MG: 10 INJECTION, EMULSION INTRAVENOUS at 12:56

## 2023-05-03 RX ADMIN — DEXAMETHASONE SODIUM PHOSPHATE 8 MG: 4 INJECTION, SOLUTION INTRA-ARTICULAR; INTRALESIONAL; INTRAMUSCULAR; INTRAVENOUS; SOFT TISSUE at 12:58

## 2023-05-03 RX ADMIN — SODIUM CHLORIDE, POTASSIUM CHLORIDE, SODIUM LACTATE AND CALCIUM CHLORIDE: 600; 310; 30; 20 INJECTION, SOLUTION INTRAVENOUS at 12:46

## 2023-05-03 RX ADMIN — ONDANSETRON 4 MG: 2 INJECTION INTRAMUSCULAR; INTRAVENOUS at 12:58

## 2023-05-03 RX ADMIN — SCOPALAMINE 1 PATCH: 1 PATCH, EXTENDED RELEASE TRANSDERMAL at 12:41

## 2023-05-03 RX ADMIN — FENTANYL CITRATE 100 MCG: 50 INJECTION, SOLUTION INTRAMUSCULAR; INTRAVENOUS at 12:51

## 2023-05-03 ASSESSMENT — PAIN SCALES - GENERAL: PAIN_LEVEL: 0

## 2023-05-03 NOTE — ANESTHESIA PROCEDURE NOTES
Airway    Date/Time: 5/3/2023 12:51 PM  Performed by: Gaby Laura M.D.  Authorized by: Gaby Laura M.D.     Location:  OR  Urgency:  Elective  Difficult Airway: No    Indications for Airway Management:  Anesthesia      Spontaneous Ventilation: absent    Sedation Level:  Deep  Preoxygenated: Yes    Patient Position:  Sniffing  MILS Maintained Throughout: No    Mask Difficulty Assessment:  0 - not attempted  Final Airway Type:  Supraglottic airway  Final Supraglottic Airway:  Standard LMA    SGA Size:  4  Number of Attempts at Approach:  1  Number of Other Approaches Attempted:  0

## 2023-05-03 NOTE — OR NURSING
1338 - Pt arrived from OR, report received from RN/anesthesia, oral airway in place, R knee dressing CDI, +2 bilateral pedal pulses, VSS    1345 - Report to Paras BUSTAMANTE

## 2023-05-03 NOTE — ANESTHESIA PREPROCEDURE EVALUATION
Case: [id number] Date/Time: 05/03/23 1215    Procedures:       RIGHT KNEE ARTHROSCOPY, RIGHT LOOSE BODY REMOVAL, SYNOVECTOMY, REPAIRS AS INDICATED      REMOVAL, LOOSE BODY, JOINT      SYNOVECTOMY    Diagnosis: Synovial chondromatosis [D48.0]    Pre-op diagnosis: Synovial chondromatosis [D48.0]    Location:  OR  / SURGERY [facility]    Surgeons: Artie Cummins M.D.          Relevant Problems   PULMONARY   (positive) Allergic asthma      NEURO   (positive) Migraine without aura      CARDIAC   (positive) Migraine without aura      GI   (positive) Gastric ulcer      Other   (positive) Generalized anxiety disorder   (positive) Synovial chondromatosis       Physical Exam    Airway   Mallampati: II  TM distance: >3 FB  Neck ROM: full       Cardiovascular - normal exam  Rhythm: regular  Rate: normal  (-) murmur     Dental - normal exam             Pulmonary - normal exam  Breath sounds clear to auscultation     Abdominal    Neurological - normal exam               Anesthesia Plan    ASA 2       Plan - general       Airway plan will be LMA        Plan Factors:   Patient was not previously instructed to abstain from smoking on day of procedure.  Patient did not smoke on day of procedure.      Induction: intravenous    Postoperative Plan: Postoperative administration of opioids is intended.    Pertinent diagnostic labs and testing reviewed    Informed Consent:    Anesthetic plan and risks discussed with patient.    Use of blood products discussed with: patient whom consented to blood products.

## 2023-05-03 NOTE — ANESTHESIA POSTPROCEDURE EVALUATION
Patient: Dasia Covarrubias    Procedure Summary     Date: 05/03/23 Room / Location:  OR 06 / SURGERY [facility]    Anesthesia Start: 1246 Anesthesia Stop: 1341    Procedures:       RIGHT KNEE ARTHROSCOPY,  SYNOVECTOMY (Right: Knee)      SYNOVECTOMY (Right: Knee) Diagnosis:       Synovial chondromatosis      (Synovial chondromatosis [D48.0])    Surgeons: Artie Cummins M.D. Responsible Provider: Gaby Laura M.D.    Anesthesia Type: general ASA Status: 2          Final Anesthesia Type: general  Last vitals  BP   Blood Pressure: (Abnormal) 150/92    Temp   36.7 °C (98.1 °F)    Pulse   82   Resp   12    SpO2   98 %      Anesthesia Post Evaluation    Patient location during evaluation: PACU  Patient participation: complete - patient participated  Level of consciousness: awake and alert  Pain score: 0    Airway patency: patent  Anesthetic complications: no  Cardiovascular status: hemodynamically stable  Respiratory status: acceptable  Hydration status: euvolemic    PONV: none          There were no known notable events for this encounter.     Nurse Pain Score: 0 (NPRS)

## 2023-05-03 NOTE — OR NURSING
1505: Patient arrived to phase II from PACU 1 via gurney. Report received from RN. Respirations are spontaneous and unlabored. VSS on RA. Dressing is CDI. RLE: pedal pulse is 2+, cap refill less than 3 seconds, warm.    1510: Family at bedside.     1520: DBP greater than 90. Robertmzadeh notified, orders received.    1530: DBP has been consistently on the higher end throughout recovery. Pt notified and educated on BP and need for PCP check up. See MAR    1558: Patient education completed, family denies further questions. DC'd to care of family post uneventful stay in PACU 2.

## 2023-05-03 NOTE — DISCHARGE INSTRUCTIONS
What to Expect Post Anesthesia    Rest and take it easy for the first 24 hours.  A responsible adult is recommended to remain with you during that time.  It is normal to feel sleepy.  We encourage you to not do anything that requires balance, judgment or coordination.    FOR 24 HOURS DO NOT:  Drive, operate machinery or run household appliances.  Drink beer or alcoholic beverages.  Make important decisions or sign legal documents.    To avoid nausea, slowly advance diet as tolerated, avoiding spicy or greasy foods for the first day.  Add more substantial food to your diet according to your provider's instructions.  Babies can be fed formula or breast milk as soon as they are hungry.  INCREASE FLUIDS AND FIBER TO AVOID CONSTIPATION.    MILD FLU-LIKE SYMPTOMS ARE NORMAL.  YOU MAY EXPERIENCE GENERALIZED MUSCLE ACHES, THROAT IRRITATION, HEADACHE AND/OR SOME NAUSEA.    If any questions arise, call your provider.  If your provider is not available, please feel free to call the Surgical Center at [phone].    MEDICATIONS: Resume taking daily medication.  Take prescribed pain medication with food.  If no medication is prescribed, you may take non-aspirin pain medication if needed.  PAIN MEDICATION CAN BE VERY CONSTIPATING.  Take a stool softener or laxative such as senokot, pericolace, or milk of magnesia if needed.    Last pain medication given at 2:30 pm    Remove scopolamine patch from behind right ear within 72 hours and wash your  hands thoroughly before touching your eyes.

## 2023-05-03 NOTE — DISCHARGE INSTR - OTHER INFO
Knee Arthroscopy Discharge instructions:    General Instructions    You will be weight bearing as tolerated on the operative leg with no brace.    Avoid all high impact activities, take it easy!    Wear your white stockings during the day - these help control the typical swelling in your legs after surgery and minimize the likelihood of blood clots.    Elevate the operative extremity when you are resting to help minimize the swelling.    Use ice to help control the swelling and pain.  DO NOT USE HEAT - this will increase the swelling.    Call the office if you develop fevers (over 100.5) or chills.    You should have an office appointment about 7-10 following your discharge from the hospital.  If you do not please call [phone].      Wound Care    You may remove your dressings 48 hours after surgery. Your incision was closed using sutures and steri-strips. These should remain in place after removing the dressings. Do NOT peel the steri-strips off or trim the sutures.    You may begin showering after your dressings are removed 2 days after surgery. Keep water exposure to the incision site brief and blot it dry when you get out.  Do not bathe or swim or Jacuzzi (ie. Do not submerge the incision) for approximately 3 to 4 weeks.    Do not use ointments or creams on the incision.      Keep the incision clean and dry.  Any drainage from the incision should be reported to the doctor immediately.      You may notice some bruising around the incision and into the operative extremity.  This is not uncommon and should begin to go away within the first 2 weeks after surgery.    Activity    Unless otherwise instructed by your doctor you can put all of your weight on your operated leg.      Estimated return to work varies depending on the demands of your job.  Some ambitious patients return to desk jobs / administrative type work as early as 1 week after surgery (but usually more like 1 month).  For active labor or heavy  "labor, it may take 3 to 6 months to return to work.     You should do the exercises given to you at discharge until you return for your post-op visit. At that time, you may be given a new set of exercises. You should continue to exercise until your muscles are pain-free and you can walk without a limp. It is a good idea to continue your exercises as a lifetime commitment to keep your muscles strong.    The question of when to drive is impossible to generalize to everyone because it is largely dependent on the individual.  Importantly, doctors do not have a license with the DMV to “clear you” or “release you” to return to driving.  There are 3 primary criteria that must be met.  You need to be off of narcotic pain medicines (otherwise you are driving under the influence).  You need to be able to get in and out of the ’s seat comfortably.  And you must have regained your normal reflexes / strength.  We recommend ‘testing’ yourself with another licensed  in an empty parking lot or quiet street first in order to check your reflexes moving your foot from pedal to pedal.      Medications    You were prescribed a short acting, narcotic pain medication.  It is recommended that you begin to wean off of this medication in about 3 days after surgery.  To help wean off of the pain medications or to supplement your pain control you can use Tylenol to help with pain.    You were prescribed a medication to prevent blood clots (Aspirin 81mg = \"baby aspirin\") which you will take twice per day for 14 days after surgery.  Take with food if stomach discomfort occurs.      You will not be discharged from the hospital with any antibiotics unless there are specific concerns regarding infection.    "

## 2023-05-03 NOTE — LETTER
April 10, 2023    Patient Name: Dasia Talbot  Surgeon Name: Artie Cummins M.D.  Surgery Facility: [facility] ([address] [place] [address])  Surgery Date: 5/5/2023    The time of your surgery is not final and may change up to and until the day of your surgery. You will be contacted 24-48 hours prior to your surgery date with your check-in and surgery time.    If you will not be at one of the below numbers please call the surgery scheduler at [phone]  Preferred Phone: [phone]    DAY OF YOUR SURGERY  Nothing to eat or drink after midnight     Refrain from smoking any substance after midnight prior to surgery. Smoking may interfere with the anesthetic and frequently produces nausea during the recovery period.    Continue taking all lifesaving medications. Including the morning of your surgery with small sip of water.    Please do NOT take on the day of surgery:  Diuretics: examples- furosemide (Lasix), spironolactone, hydrochlorothiazide  ACE-inhibitors: examples- lisinopril, ramipril, enalapril  “ARBs”: examples- losartan, Olmesartan, valsartan    Please arrive at the hospital/surgery center at the check-in time provided.     An adult will need to bring you and take you home after your surgery.     AFTER YOUR SURGERY  Post op Appointment:   Date: 5/15/23   Time: 2:00PM   With: Artie Cummins MD   Location: [address] [place] [id number]    - Therapy- Your first appointment should be 2-3  day(s) after your post-op appointment. For your convenience we have 4 Physical Therapy locations: [facility], [place], and [facility]. Call our office ASAP to schedule an appointment at [phone] or take the enclosed Therapy Prescription to a facility of your choice.  - Post Surgery - You will need someone to drive you home  - Post Surgery - You will need someone to stay with you for 24 hours      TIME OFF WORK  FMLA or Disability forms can be faxed  directly to: [phone] or you may drop them off at [address] [place] [address]. Our office charges a $35.00 fee per form. Forms will be completed within 10 business days of drop off and payment received. For the status of your forms you may contact our disability office directly [phone].    MEDICATION INSTRUCTIONS **Please read section completely**    The following medications should be stopped a minimum of 10 days prior to surgery:  All over the counter, Supplements & Herbal medications    Anorectics: Phentermine (Adipex-P, Lomaira and Suprenza), Phentermine-topiramate (Qsymia), Bupropion-naltrexone (Contrave)    Opiod Partial Agonists/Opioid Antagonists: Buprenorphine (Subocone, Belbuca, Butrans, Probuphine Implant, Sublocade), Naltrexone (ReVia, Vivitrol), Naloxone    Amphetamines: Dextroamphetamine/Amphetamine (Adderall, Mydayis), Methylphenidate Hydrochloride (Concerta, Metadate, Methylin, Ritalin)    The following medications should be stopped 5 days prior to surgery:  Blood Thinners: Any Aspirin, Aspirin products, anti-inflammatories such as ibuprofen and any blood thinners such as Coumadin and Plavix. Please consult your prescribing physician if you are on life saving blood thinners, in regards to when to stop medications prior to surgery.     The following medications should be stopped a minimum of 3 days prior to surgery:  PDE-5 inhibitors: Sildenafil (Viagra), Tadalafil (Cialis), Vardenafil (Levitra), Avanafil (Stendra)    MAO Inhibitors: Rasagiline (Azilect), Selegiline (Eldepryl, Emsam, Selapar), Isocarboxazid (Marplan), Phenelzine (Nardil)         COVID and INFLUENZA NOTICE TO PATIENTS    Currently, the [facility] does not routinely test patients for COVID-19 or Influenza prior to their elective surgery.  However, if patients develop the following symptoms prior to their surgery date, they should voluntarily test for COVID-19 and Influenza and notify the  surgical office of their condition and results.  The symptoms warranting testing would be any two of the following:    Fever (Temp above 100.4 F)  Chills  Cough  Shortness of breath or difficulty breathing  Fatigue  Myalgias (muscle or body aches)  Headache  Sore Throat  Congestion or Runny Nose  Nausea or vomiting  Diarrhea  New loss of taste or smell    Having these symptoms prior to surgery can significantly increase your risk of morbidity and mortality under anesthesia, which may compromise your health and require a transfer to a hospital for a higher level of care.  Therefore, it is advisable to notify the surgical team of any illness in order to get information for the appropriate time to delay the surgery to minimize these preventable risks.    Your health and safety are our number one priority at the [facility], and we are thankful that you entrust us with your care.  Please help us ensure the best possible surgical and anesthetic outcome by sharing appropriate health information with our perioperative team and staff.  We look forward to taking great care of you!    Thank you for your time and consideration on this matter.    Paul Collins MD  Medical Director  Anesthesiologist  [facility]

## 2023-05-03 NOTE — OP REPORT
DATE OF OPERATION: 5/3/2023    SURGEON: Artie Cummins M.D.     ASSISTANT: None    PREOPERATIVE DIAGNOSES:  1. Right knee synovial chondromatosis    POSTOPERATIVE DIAGNOSES:  1. Right knee synovial chondromatosis    PROCEDURE:  1. Right knee arthroscopy with synovectomy of the anterior, medial, lateral, and suprapatellar compartments    ANESTHESIA: Gaby Laura MD    ANESTHETIC: LMA anesthesia along with a local injection.    ESTIMATED BLOOD LOSS: Minimal.    COMPLICATIONS: None.    IMPLANTS: None    INDICATIONS: This is a 52 year old female who presented to my clinic with pain and mechanical symptoms in the right knee with a history of synovial chondromatosis.  She has failed nonoperative treatment with activity modification, oral anti-inflammatories, and a dedicated exercise program and elected to proceed with operative intervention.  She also previously underwent a right knee arthroscopy and loose body excision in the past.  She was explained the risks, benefits, alternatives, procedure and recovery in detail. All questions were answered. Informed consent was obtained. Site verification per protocol was obtained in the pre-op holding area and the operating room. Patient received appropriate preoperative antibiotics.    FINDINGS: Preoperative examination under anesthesia demonstrated well-preserved motion and a stable ligamentous exam.  Arthroscopic examination demonstrated no identifiable loose bodies within the suprapatellar pouch or medial or lateral compartments.  There was extensive synovitis throughout the suprapatellar pouch, medial, lateral, and anterior compartments.  The notch demonstrated intact ACL and PCL.  The medial and lateral compartments demonstrated intact menisci.  The medial compartment demonstrated pristine cartilage.  The lateral compartment demonstrated well-maintained cartilage on the femoral condyle with grade II chondromalacia of the lateral tibial plateau.  There was grade II  chondromalacia of the patella, particular more proximally.    OPERATION: The patient was brought to the operating room and positioned supine on the operating table. General anesthesia was induced and the patient's knee was examined. The patient had a stable ligamentous exam and good range of motion. The leg was then prepped and draped in the usual sterile fashion. Surgical timeout was performed per protocol, identifying the correct patient, operative extremity, and procedure. I then used a #11 blade knife to establish an anterolateral portal and the arthroscopic trocar was inserted into the suprapatellar pouch. The suprapatellar pouch and the medial and lateral gutters demonstrated no loose bodies with synovitis.  The patellar and trochlear cartilage surfaces were evaluated with findings described above.  I then entered the intercondylar notch and the ACL and PCL were intact.  I then established an anteromedial portal using spinal needle localization.  An arthroscopic probe was then inserted and a diagnostic arthroscopy was performed yielding the above findings.  I began by trying to identify any visible loose bodies within the knee.  I did milk the back of the knee and the sheath to the popliteus while using suction within the knee.  There were no identifiable loose bodies.  I established a posterior medial portal to look in the back of the knee for any loose bodies and there were none identified.  I then proceeded to the synovectomy.  I used a 4.0 mm shaver and radiofrequency wand to perform a synovectomy anteriorly, medially, laterally, and within the suprapatellar pouch.    At that point, I was satisfied with the procedure. I lavaged out the joint, suctioned out the fluid, and closed the portals with 3-0 Prolene in an interrupted fashion. Xeroform, 4x4s, and an ACE wrap were applied. Patient was transferred to recovery room in stable condition.    POST-OPERATIVE PLAN: The patient will be discharged home with  post-operative pain control and ASA 81mg VTE chemoprophylaxis. Compression stockings will also be used for mechanical VTE chemoprophylaxis. They will be WBAT and ROM as tolerated on the operative extremity. First post-operative follow-up will be at 10-14 days for suture removal and wound check. Physical therapy may begin immediately.    Artie Cummins M.D.

## 2023-05-03 NOTE — ANESTHESIA TIME REPORT
Anesthesia Start and Stop Event Times     Date Time Event    5/3/2023 12:40 PM Ready for Procedure    5/3/2023 12:46 PM Anesthesia Start    5/3/2023 01:41 PM Anesthesia Stop        Responsible Staff  05/03/23    Name Role Begin End    Gaby Laura M.D. Anesthesiologist 05/03/23 12:46 PM 05/03/23 01:41 PM        Overtime Reason:  no overtime (within assigned shift)    Comments:

## 2023-05-03 NOTE — INTERVAL H&P NOTE
H&P reviewed. The patient was examined and there are no changes to the H&P    ROS otherwise negative.

## 2023-05-03 NOTE — OR NURSING
1345: Report rec'd from CYNTHIA Stringer RN. Pt sleeping w/ OPA in place. Palpable DP pulse observed on operative extremity.    1355: OPA dc'd, breathing is spontaneous and unlabored.    1403: Pt slightly more awake. Currently denies pain.    1410: Remains pain free. Drinking water w/o nausea.    1427: Medicated for pain.    1443: Pain is currently tolerable at 3/10.

## 2023-05-03 NOTE — LETTER
April 26, 2023    Patient Name: Dasia Talbot  Surgeon Name: Artie Cummins M.D.  Surgery Facility: [facility] ([address] [facility])  Surgery Date: 5/3/2023    The time of your surgery is not final and may change up to and until the day of your surgery. You will be contacted 24-48 hours prior to your surgery date with your check-in and surgery time.    If you will not be at one of the below numbers please call the surgery scheduler at [phone]  Preferred Phone: [phone]    BEFORE YOUR SURGERY   Pre Registration and/or Lab Work must be done within and no earlier than 28 days prior to your surgery date. Please call [facility] at [phone] for an appointment as soon as possible.    DAY OF YOUR SURGERY  Nothing to eat or drink after midnight     Refrain from smoking any substance after midnight prior to surgery. Smoking may interfere with the anesthetic and frequently produces nausea during the recovery period.    Continue taking all lifesaving medications. Including the morning of your surgery with small sip of water.    Please do NOT take on the day of surgery:  Diuretics: examples- furosemide (Lasix), spironolactone, hydrochlorothiazide  ACE-inhibitors: examples- lisinopril, ramipril, enalapril  “ARBs”: examples- losartan, Olmesartan, valsartan    Please arrive at the hospital/surgery center at the check-in time provided.     An adult will need to bring you and take you home after your surgery.     AFTER YOUR SURGERY  Post op Appointment:   Date: 05/15/23   Time: 2:00PM   With: Artie Cummins MD   Location: [address] [place] [id number]    - Therapy- Your first appointment should be 2-3  day(s) after your post-op appointment. For your convenience we have 4 Physical Therapy locations: [facility], [place], and [facility]. Call our office ASAP to schedule an appointment at [phone] or take the enclosed  Therapy Prescription to a facility of your choice.  - Post Surgery - You will need someone to drive you home  - Post Surgery - You will need someone to stay with you for 24 hours     TIME OFF WORK  FMLA or Disability forms can be faxed directly to: [phone] or you may drop them off at [address] [place] [address]. Our office charges a $35.00 fee per form. Forms will be completed within 10 business days of drop off and payment received. For the status of your forms you may contact our disability office directly [phone].    MEDICATION INSTRUCTIONS **Please read section completely**    The following medications should be stopped a minimum of 10 days prior to surgery:  All over the counter, Supplements & Herbal medications    Anorectics: Phentermine (Adipex-P, Lomaira and Suprenza), Phentermine-topiramate (Qsymia), Bupropion-naltrexone (Contrave)    Opiod Partial Agonists/Opioid Antagonists: Buprenorphine (Subocone, Belbuca, Butrans, Probuphine Implant, Sublocade), Naltrexone (ReVia, Vivitrol), Naloxone    Amphetamines: Dextroamphetamine/Amphetamine (Adderall, Mydayis), Methylphenidate Hydrochloride (Concerta, Metadate, Methylin, Ritalin)    The following medications should be stopped 5 days prior to surgery:  Blood Thinners: Any Aspirin, Aspirin products, anti-inflammatories such as ibuprofen and any blood thinners such as Coumadin and Plavix. Please consult your prescribing physician if you are on life saving blood thinners, in regards to when to stop medications prior to surgery.     The following medications should be stopped a minimum of 3 days prior to surgery:  PDE-5 inhibitors: Sildenafil (Viagra), Tadalafil (Cialis), Vardenafil (Levitra), Avanafil (Stendra)    MAO Inhibitors: Rasagiline (Azilect), Selegiline (Eldepryl, Emsam, Selapar), Isocarboxazid (Marplan), Phenelzine (Nardil)         COVID and INFLUENZA NOTICE TO PATIENTS    Currently, the [facility] does not  routinely test patients for COVID-19 or Influenza prior to their elective surgery.  However, if patients develop the following symptoms prior to their surgery date, they should voluntarily test for COVID-19 and Influenza and notify the surgical office of their condition and results.  The symptoms warranting testing would be any two of the following:    Fever (Temp above 100.4 F)  Chills  Cough  Shortness of breath or difficulty breathing  Fatigue  Myalgias (muscle or body aches)  Headache  Sore Throat  Congestion or Runny Nose  Nausea or vomiting  Diarrhea  New loss of taste or smell    Having these symptoms prior to surgery can significantly increase your risk of morbidity and mortality under anesthesia, which may compromise your health and require a transfer to a hospital for a higher level of care.  Therefore, it is advisable to notify the surgical team of any illness in order to get information for the appropriate time to delay the surgery to minimize these preventable risks.    Your health and safety are our number one priority at the [facility], and we are thankful that you entrust us with your care.  Please help us ensure the best possible surgical and anesthetic outcome by sharing appropriate health information with our perioperative team and staff.  We look forward to taking great care of you!    Thank you for your time and consideration on this matter.    Paul Collins MD  Medical Director  Anesthesiologist  [facility]

## 2023-06-18 ENCOUNTER — OFFICE VISIT (OUTPATIENT)
Dept: URGENT CARE | Facility: PHYSICIAN GROUP | Age: 53
End: 2023-06-18
Payer: COMMERCIAL

## 2023-06-18 VITALS
OXYGEN SATURATION: 98 % | SYSTOLIC BLOOD PRESSURE: 120 MMHG | BODY MASS INDEX: 24.41 KG/M2 | RESPIRATION RATE: 14 BRPM | HEIGHT: 64 IN | DIASTOLIC BLOOD PRESSURE: 82 MMHG | TEMPERATURE: 97.6 F | WEIGHT: 143 LBS | HEART RATE: 98 BPM

## 2023-06-18 DIAGNOSIS — S16.1XXA STRAIN OF NECK MUSCLE, INITIAL ENCOUNTER: ICD-10-CM

## 2023-06-18 PROCEDURE — 99213 OFFICE O/P EST LOW 20 MIN: CPT | Performed by: FAMILY MEDICINE

## 2023-06-18 PROCEDURE — 3079F DIAST BP 80-89 MM HG: CPT | Performed by: FAMILY MEDICINE

## 2023-06-18 PROCEDURE — 3074F SYST BP LT 130 MM HG: CPT | Performed by: FAMILY MEDICINE

## 2023-06-18 RX ORDER — CYCLOBENZAPRINE HCL 10 MG
10 TABLET ORAL 3 TIMES DAILY PRN
Qty: 30 TABLET | Refills: 0 | Status: SHIPPED | OUTPATIENT
Start: 2023-06-18 | End: 2024-01-07
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
## 2023-09-22 ENCOUNTER — HOSPITAL ENCOUNTER (OUTPATIENT)
Dept: RADIOLOGY | Facility: MEDICAL CENTER | Age: 53
End: 2023-09-22
Payer: COMMERCIAL

## 2023-10-04 ENCOUNTER — HOSPITAL ENCOUNTER (OUTPATIENT)
Dept: RADIOLOGY | Facility: MEDICAL CENTER | Age: 53
End: 2023-10-04
Attending: STUDENT IN AN ORGANIZED HEALTH CARE EDUCATION/TRAINING PROGRAM
Payer: COMMERCIAL

## 2023-10-04 DIAGNOSIS — Z12.31 VISIT FOR SCREENING MAMMOGRAM: ICD-10-CM

## 2023-10-04 PROCEDURE — 77063 BREAST TOMOSYNTHESIS BI: CPT

## 2024-01-07 ENCOUNTER — OFFICE VISIT (OUTPATIENT)
Dept: URGENT CARE | Facility: PHYSICIAN GROUP | Age: 54
End: 2024-01-07
Payer: COMMERCIAL

## 2024-01-07 VITALS
TEMPERATURE: 97.7 F | SYSTOLIC BLOOD PRESSURE: 100 MMHG | OXYGEN SATURATION: 100 % | BODY MASS INDEX: 24.07 KG/M2 | DIASTOLIC BLOOD PRESSURE: 60 MMHG | WEIGHT: 141 LBS | HEIGHT: 64 IN | RESPIRATION RATE: 18 BRPM | HEART RATE: 88 BPM

## 2024-01-07 DIAGNOSIS — G43.909 MIGRAINE WITHOUT STATUS MIGRAINOSUS, NOT INTRACTABLE, UNSPECIFIED MIGRAINE TYPE: ICD-10-CM

## 2024-01-07 DIAGNOSIS — R11.0 NAUSEA: ICD-10-CM

## 2024-01-07 PROCEDURE — 3074F SYST BP LT 130 MM HG: CPT | Performed by: NURSE PRACTITIONER

## 2024-01-07 PROCEDURE — 99214 OFFICE O/P EST MOD 30 MIN: CPT | Performed by: NURSE PRACTITIONER

## 2024-01-07 PROCEDURE — 3078F DIAST BP <80 MM HG: CPT | Performed by: NURSE PRACTITIONER

## 2024-01-07 RX ORDER — KETOROLAC TROMETHAMINE 30 MG/ML
15 INJECTION, SOLUTION INTRAMUSCULAR; INTRAVENOUS ONCE
Status: COMPLETED | OUTPATIENT
Start: 2024-01-07 | End: 2024-01-07

## 2024-01-07 RX ORDER — BUTALBITAL, ACETAMINOPHEN AND CAFFEINE 50; 325; 40 MG/1; MG/1; MG/1
1 TABLET ORAL EVERY 4 HOURS PRN
Qty: 18 TABLET | Refills: 0 | Status: SHIPPED | OUTPATIENT
Start: 2024-01-07 | End: 2024-01-10

## 2024-01-07 RX ORDER — ONDANSETRON 4 MG/1
4 TABLET, ORALLY DISINTEGRATING ORAL ONCE
Status: COMPLETED | OUTPATIENT
Start: 2024-01-07 | End: 2024-01-07

## 2024-01-07 RX ORDER — ONDANSETRON 4 MG/1
4 TABLET, ORALLY DISINTEGRATING ORAL EVERY 8 HOURS PRN
Qty: 10 TABLET | Refills: 0 | Status: SHIPPED | OUTPATIENT
Start: 2024-01-07

## 2024-01-07 RX ADMIN — ONDANSETRON 4 MG: 4 TABLET, ORALLY DISINTEGRATING ORAL at 12:10

## 2024-01-07 RX ADMIN — KETOROLAC TROMETHAMINE 15 MG: 30 INJECTION, SOLUTION INTRAMUSCULAR; INTRAVENOUS at 12:09

## 2024-01-07 ASSESSMENT — ENCOUNTER SYMPTOMS
SORE THROAT: 0
NAUSEA: 1
DIZZINESS: 0
FEVER: 0
MYALGIAS: 0
EYE PAIN: 0
HEADACHES: 1
SHORTNESS OF BREATH: 0
VOMITING: 0
CHILLS: 0

## 2024-01-07 NOTE — PROGRESS NOTES
Subjective:   Dasia Covarrubias is a 53 y.o. female who presents for Headache (X 1 wk ) and Chills      HPI  Patient is a 53-year-old female presents urgent care for evaluation of a persistent headache this been going on for the past 1 to 2 weeks.  Patient does have a history of migraine headaches however typically resolve with over-the-counter medications.  She has been having body aches, denies any fevers, denies any cough, sore throat.  Denies sick contacts.  Patient does have nausea with light sensitivity.  Review of Systems   Constitutional:  Negative for chills and fever.   HENT:  Negative for sore throat.    Eyes:  Negative for pain.   Respiratory:  Negative for shortness of breath.    Cardiovascular:  Negative for chest pain.   Gastrointestinal:  Positive for nausea. Negative for vomiting.   Genitourinary:  Negative for hematuria.   Musculoskeletal:  Negative for myalgias.   Skin:  Negative for rash.   Neurological:  Positive for headaches. Negative for dizziness.       Medications:    albuterol Aers  ALPRAZolam Tabs  aspirin  atorvastatin Tabs  azelastine  butalbital/apap/caffeine Tabs  cetirizine Tabs  Cholecalciferol Caps  diclofenac sodium Gel  FLUoxetine Caps  ondansetron Tbdp    Allergies: Penicillins and Sulfa drugs    Problem List: Dasia Covarrubias does not have any pertinent problems on file.    Surgical History:  Past Surgical History:   Procedure Laterality Date    PB KNEE SCOPE,DIAGNOSTIC Right 5/3/2023    Procedure: RIGHT KNEE ARTHROSCOPY,  SYNOVECTOMY;  Surgeon: Artie Cummins M.D.;  Location: SURGERY [facility];  Service: Orthopedics    SYNOVECTOMY Right 5/3/2023    Procedure: SYNOVECTOMY;  Surgeon: Artie Cummins M.D.;  Location: [facility];  Service: Orthopedics    KNEE ARTHROSCOPY Left 2020    KNEE ARTHROSCOPY Right 12/11/2019    Procedure: RT KNEE ARTHROSCOPY LOOSE BODY REMOVAL, EXTENSIVE SYNOVECTOMY, CHONDROPLASTY;  Surgeon: Eliceo Palmer,  "M.D.;  Location: SURGERY [facility];  Service: Orthopedics    FOOT SURGERY Bilateral     Remove loos body 2022    STONE RETRIEVAL      for kidney stone x2 has had both left and right    TUBAL LIGATION         Past Social Hx: Dasia Covarrubias  reports that she has never smoked. She has never used smokeless tobacco. She reports current alcohol use of about 0.5 oz of alcohol per week. She reports that she does not use drugs.     Past Family Hx:  Dasia Covarrubias family history includes Cancer in her maternal grandfather, mother, and paternal grandmother; Cancer (age of onset: 35) in her sister; Heart Attack (age of onset: 52) in her father; Heart Disease in her father; Hyperlipidemia in her father; Hypertension in her father.     Problem list, medications, and allergies reviewed by myself today in Epic.     Objective:     /60   Pulse 88   Temp 36.5 °C (97.7 °F) (Temporal)   Resp 18   Ht 1.626 m (5' 4\")   Wt 64 kg (141 lb)   SpO2 100%   BMI 24.20 kg/m²     Physical Exam  Vitals and nursing note reviewed.   Constitutional:       General: She is not in acute distress.     Appearance: She is well-developed.   HENT:      Head: Normocephalic and atraumatic.      Right Ear: External ear normal.      Left Ear: External ear normal.      Nose: Nose normal.      Mouth/Throat:      Mouth: Mucous membranes are moist.   Eyes:      Conjunctiva/sclera: Conjunctivae normal.   Neck:      Meningeal: Brudzinski's sign and Kernig's sign absent.   Cardiovascular:      Rate and Rhythm: Normal rate.   Pulmonary:      Effort: Pulmonary effort is normal. No respiratory distress.      Breath sounds: Normal breath sounds.   Abdominal:      General: There is no distension.   Musculoskeletal:         General: Normal range of motion.      Cervical back: Full passive range of motion without pain.   Skin:     General: Skin is warm and dry.   Neurological:      General: No focal deficit present.      Mental " Status: She is alert and oriented to person, place, and time. Mental status is at baseline. She is not disoriented.      GCS: GCS eye subscore is 4. GCS verbal subscore is 5. GCS motor subscore is 6.      Cranial Nerves: No cranial nerve deficit.      Sensory: No sensory deficit.      Gait: Gait (gait at baseline) normal.      Deep Tendon Reflexes: Reflexes are normal and symmetric.   Psychiatric:         Judgment: Judgment normal.         Assessment/Plan:     Diagnosis and associated orders:     1. Migraine without status migrainosus, not intractable, unspecified migraine type  ketorolac (Toradol) injection 15 mg    butalbital/apap/caffeine (FIORICET) -40 mg Tab      2. Nausea  ondansetron (Zofran ODT) dispertab 4 mg    ondansetron (ZOFRAN ODT) 4 MG TABLET DISPERSIBLE         Comments/MDM:     I personally reviewed prior external notes and prior test results pertinent to today's visit.  Patient does have a history of headaches having acute exacerbation with migraine neuro exam unremarkable patient having a persistent headache afebrile, no signs of meningitis.  Will trial Rx medications Toradol administered in clinic.  Discussed management options, risks and benefits, and alternatives to treatment plan agreed upon.   Red flags discussed and indications to immediately call 911 or present to the Emergency Department.   Supportive care, differential diagnoses, and indications for immediate follow-up discussed with patient.    Patient expresses understanding and agrees to plan. Patient denies any other questions or concerns.                Please note that this dictation was created using voice recognition software. I have made a reasonable attempt to correct obvious errors, but I expect that there are errors of grammar and possibly content that I did not discover before finalizing the note.    This note was electronically signed by Tyrell DEMPSEY.

## 2024-03-19 ENCOUNTER — GYNECOLOGY VISIT (OUTPATIENT)
Dept: GYNECOLOGY | Facility: CLINIC | Age: 54
End: 2024-03-19
Payer: COMMERCIAL

## 2024-03-19 VITALS
HEIGHT: 64 IN | BODY MASS INDEX: 23.9 KG/M2 | HEART RATE: 77 BPM | DIASTOLIC BLOOD PRESSURE: 81 MMHG | SYSTOLIC BLOOD PRESSURE: 114 MMHG | WEIGHT: 140 LBS

## 2024-03-19 DIAGNOSIS — Z98.890 HISTORY OF SUBURETHRAL SLING PROCEDURE: ICD-10-CM

## 2024-03-19 DIAGNOSIS — R15.1 FECAL SMEARING: ICD-10-CM

## 2024-03-19 DIAGNOSIS — N39.46 URINARY INCONTINENCE, MIXED: Primary | ICD-10-CM

## 2024-03-19 DIAGNOSIS — N95.2 ATROPHIC VAGINITIS: ICD-10-CM

## 2024-03-19 LAB
APPEARANCE UR: CLEAR
BILIRUB UR STRIP-MCNC: NEGATIVE MG/DL
COLOR UR AUTO: YELLOW
GLUCOSE UR STRIP.AUTO-MCNC: NEGATIVE MG/DL
KETONES UR STRIP.AUTO-MCNC: NEGATIVE MG/DL
LEUKOCYTE ESTERASE UR QL STRIP.AUTO: NEGATIVE
NITRITE UR QL STRIP.AUTO: NEGATIVE
PH UR STRIP.AUTO: 7 [PH] (ref 5–8)
PROT UR QL STRIP: NEGATIVE MG/DL
RBC UR QL AUTO: NORMAL
SP GR UR STRIP.AUTO: 1.02
UROBILINOGEN UR STRIP-MCNC: NORMAL MG/DL

## 2024-03-19 PROCEDURE — 81002 URINALYSIS NONAUTO W/O SCOPE: CPT | Performed by: STUDENT IN AN ORGANIZED HEALTH CARE EDUCATION/TRAINING PROGRAM

## 2024-03-19 PROCEDURE — 3074F SYST BP LT 130 MM HG: CPT | Performed by: STUDENT IN AN ORGANIZED HEALTH CARE EDUCATION/TRAINING PROGRAM

## 2024-03-19 PROCEDURE — 3079F DIAST BP 80-89 MM HG: CPT | Performed by: STUDENT IN AN ORGANIZED HEALTH CARE EDUCATION/TRAINING PROGRAM

## 2024-03-19 PROCEDURE — 99204 OFFICE O/P NEW MOD 45 MIN: CPT | Performed by: STUDENT IN AN ORGANIZED HEALTH CARE EDUCATION/TRAINING PROGRAM

## 2024-03-19 NOTE — PROGRESS NOTES
Urogynecology & Reconstructive Pelvic Surgery - Consultation Visit    Dasia Covarrubias MRN:[id number] :1970    Referred by: Trinity Mayo DO    Reason for Visit:   Chief Complaint   Patient presents with    New Patient     Consult          Subjective     History of Presenting Illness:    Dasia Covarrubias is a 53 y.o. year old P3 who presents for the evaluation and management of recurrent urinary incontinence.     She is significantly bothered by urinary incontinence, multiple times per day requiring pads.  This is usually with activities including exercise, cough, sneeze, laugh, movement.  She also has some urgency and frequency, going up to 10 times per day, and some urge leakages.  When asked, her stress type urinary symptoms are more bothersome than urge.    He has history of a prior mid urethral sling in , unsure if this is transobturator or retropubic type, and this helped her stress incontinence for a few years but slowly worsened.  Her symptoms are now worse than before her last sling.    She had been started on vaginal estrogen tablets by her gynecologist, and has used these for 1 year.  She had some concerns about breast cancer, which were reassured today    Prior Pelvic surgery:    bilateral tubal ligation ([facility])   sling, unsure which type, op report not available     Prior treatment:   vaginal estrogen tablet - 2x weekly    Pelvic floor symptom review:     Bladder:   Voids per day: 10+ Voids per night: 1      Urinary incontinence episodes per day: 5-6    Urge leakage:  On Movement to Bathroom and Full Bladder   Stress leakage: With Cough, With Laugh, With Exercise, With Buckley, With Bending/Squatting, and Upon Standing   Stress> urge   Continuous / insensible urine loss: No    Nocturnal enuresis: No    Leakage volume: Moderate   Number of pads/day: 3-4    Bladder emptying: Incomplete   Voiding symptoms: Hesitancy, Post-Void Dribble, Weak Stream, and Double or  Triple Voiding   UTI in last 12 months: yes   Other urologic history: h/o stones      Prolapse:     Prolapse symptoms: None     Bowel:    Constipation: some   Bowel movements per day: 4/week    Straining to empty bowels: No    Splinting to evacuate: No    Painful evacuation: No    Difficulty emptying rectum: No    Incontinence to stool: 1-2 month, passive, staining, looser stool   Blood in stool: No    Hemorrhoids: No        Sexual function:    Sexually active: No    Gender of partners: Male   Pain with intercourse: No      Past medical and surgical history    Past obstetric history   Number of vaginal deliveries: 3   Number of  deliveries: 0   History of vacuum/forceps: No    History of obstetric anal sphincter injury: No     Past gynecological history:    Last menstrual period: No LMP recorded. Patient is postmenopausal.   History of abnormal uterine bleeding: No    History of fibroids: No    History of endometrial polyps:  No    History of endometriosis: No    History of cervical dysplasia: No      Past medical history:  Past Medical History:   Diagnosis Date    Unspecified vitamin D deficiency 2011    Allergic asthma 2011    Insomnia 2011    PMDD (premenstrual dysphoric disorder) 2011    Pure hypercholesterolemia 2011    Calcium nephrolithiasis 2011    Kidney stones, mixed calcium oxalate 2008    has had 2 different time, always requiring surgery to remove    Anesthesia     motion sickness    Heart burn     High cholesterol      Past surgical history:  Past Surgical History:   Procedure Laterality Date    PB KNEE SCOPE,DIAGNOSTIC Right 5/3/2023    Procedure: RIGHT KNEE ARTHROSCOPY,  SYNOVECTOMY;  Surgeon: Artie Cummins M.D.;  Location: SURGERY [facility];  Service: Orthopedics    SYNOVECTOMY Right 5/3/2023    Procedure: SYNOVECTOMY;  Surgeon: Artie Cummins M.D.;  Location: SURGERY [facility];  Service: Orthopedics    KNEE ARTHROSCOPY Left     KNEE  "ARTHROSCOPY Right 12/11/2019    Procedure: RT KNEE ARTHROSCOPY LOOSE BODY REMOVAL, EXTENSIVE SYNOVECTOMY, CHONDROPLASTY;  Surgeon: Eliceo Palmer M.D.;  Location: SURGERY [facility];  Service: Orthopedics    FOOT SURGERY Bilateral     Remove loos body 2022    STONE RETRIEVAL      for kidney stone x2 has had both left and right    TUBAL LIGATION       Medications:has a current medication list which includes the following prescription(s): indomethacin, methylprednisolone, ondansetron, diclofenac sodium, aspirin, cetirizine, azelastine, atorvastatin, fluoxetine, alprazolam, albuterol, and cholecalciferol.  Allergies:Penicillins and Sulfa drugs  Family history:  Family History   Problem Relation Age of Onset    Cancer Mother         leukemia and carcinoid    Heart Attack Father 52        multiple heart attacks    Heart Disease Father     Hyperlipidemia Father     Hypertension Father     Cancer Sister 35        breast cancer    Cancer Maternal Grandfather         prostate cancer    Cancer Paternal Grandmother         colon cancer     Social history: reports that she has never smoked. She has never used smokeless tobacco. She reports current alcohol use of about 0.5 oz of alcohol per week. She reports that she does not use drugs.    Review of systems: A full review of systems was performed, and negative with the exception of want is noted above in the HPI.        Objective        /81 (BP Location: Left arm, Patient Position: Sitting, BP Cuff Size: Adult)   Pulse 77   Ht 5' 4\"   Wt 140 lb   BMI 24.03 kg/m²     Physical Exam  Vitals reviewed. Exam conducted with a chaperone present.   Constitutional:       Appearance: Normal appearance.   HENT:      Head: Normocephalic.      Mouth/Throat:      Mouth: Mucous membranes are moist.   Cardiovascular:      Rate and Rhythm: Normal rate.   Pulmonary:      Effort: Pulmonary effort is normal.   Skin:     General: Skin is warm and dry.   Neurological:      Mental " Status: She is alert.   Psychiatric:         Mood and Affect: Mood normal.         Procedure Performed: No    Diagnostic test and records review:      Post-void residual: 50 mL, performed by Bladder Scanner    Labs: n/a    Radiology: n/a    Procedural: n/a    Documentation reviewed: Prior EMR Records         Assessment & Plan     Dasia Covarrubias is a 53 y.o. year old P3 with stress predominant mixed urinary incontinence. We discussed my recommendations for further diagnosis and treatment at length today.     1. Urinary incontinence, mixed, stress predominant  2. History of suburethral sling procedure  She reports symptoms of mixed stress and urge urinary incontinence.  Due to the overlapping nature of her symptoms, will need to be careful in order to take the steps necessary to treat both her urge and stress symptoms.  Any further testing or workup is assigned to nicho in on the best treatments and optimize outcomes.    She was educated on the pathophysiology of bladder urgency, and that her symptoms are likely due to overactivity of the bladder muscle and nerves -treatments for this include those that calm the bladder muscle down. These include fluid management, avoidance of bladder irritants, urge strategies and Kegel's exercises.   She reports stress urinary incontinence (PEGGY) symptoms. The pathogenesis of PEGGY includes genetic tendency,  aging, menopause and childbirth injuries, and is overall caused by a weakness of the pelvic support of the urethra, and thus interventions to fix this enhance the structure either via behavioral/physical therapy or through surgical intervention.  I discussed options for management which include both nonsurgical and surgical options. Procedural interventions aimed to strengthen the urethral support or the urethral opening, and there are various techniques that are tailored to each patient's symptoms and bladder functio.  Previously had improvement with the sling, but  the symptoms returned.  I counseled that she is either a candidate for repeat retropubic sling, versus urethral bulking, with different risk and benefits as noted below.  A mid urethral sling is the gold standard treatment for most stress urinary incontinence, especially when the urethra is hypermobile/unsupported.  This involves the placement of a safe, light weight piece of mesh under the urethra to help support it back into the normal anatomic location.  This is an outpatient vaginal surgery with same-day discharge and no need for narcotic pain medications.  While not the most painful procedure, there are restrictions on activity for 6 weeks afterwards including vigorous exercise, heavy lifting, straining, intercourse, sitting in water/swimming.  Approximately 90% of patients experience of significant improvement in their leakage symptoms after sling procedure, and 60 to 70% report a complete resolution of her urinary leakage.  Patients with urgency may also see improvement, but this may also persist or worsen due to different underlying causes of urinary urgency.  Approximately 30% of patients may require a temporary Scott catheter after this procedure, and 1/50 patients may need an adjustment to loosen the sling in the immediate postoperative period due to overtightening.  Mesh is considered overwhelmingly safe and has been extensively studied, but there is an approximately 1 to 3% chance long-term of a mesh complication, the majority of which are small mesh exposures which can be managed either through vaginal estrogen or excision of mesh.  Major complications are rare.   A urethral bulking procedure using Bulkamid is an alternative therapy for stress urinary incontinence.  This does not use any permanent implanted materials, but employed as a safe hydrogel which allows ingrowth of the patient's own tissue to strengthen the opening of the bladder into the urethra.  This is also a same-day procedure without any  postoperative restrictions.  While less invasive, success rates are lower when compared to the sling, with approximately 60% of patients seeing a dramatic improvement in their symptoms, 90% of patients seeing some improvement.  1/4 patients require 2 treatments in order to get optimal therapeutic results.  There is a 10 to 20% chance of needing a temporary Scott catheter for 1-2 days after the procedure.  Due to the significant mixed nature of her symptoms as well as her voiding dysfunction and history of prior sling, I recommend preoperative cystourethroscopy to rule out mesh exposures and urodynamics to fully confirm that sling is the best surgical option.  She would like to move forward with scheduling surgery, knowing that she requires workup before hand and we may need to pivot with her options.  Schedule for: Retropubic mid urethral sling, cystourethroscopy, and all indicated procedures.      3. Fecal staining  She has occasional fecal staining, with loose stools, passive.  I counseled that this is likely due to pelvic floor dysfunction, and recommended pelvic physical therapy to aid in the ability to prevent the symptoms  - Referral to Physical Therapy    4. Genitourinary syndrome of menopause  She has vaginal atrophy on examination. Reviewed risks, benefits, and indications for vaginal estrogen therapy.  Continue with vaginal estrogen therapy twice weekly.                  Anabella Armas MD, FACOG  Urogynecology and Reconstructive Pelvic Surgery  Department of Obstetrics and Gynecology  [facility]        This medical record contains text that has been entered with the assistance of computer voice recognition and dictation software.  Therefore, it may contain unintended errors in text, spelling, punctuation, or grammar

## 2024-03-19 NOTE — PROGRESS NOTES
PT here today for consult   Ref for Mixed Incontinence   Hysterectomy? X  Good #: [phone] (home)   PVR : 50 mL  Pharmacy Verified

## 2024-03-19 NOTE — PATIENT INSTRUCTIONS
Pre-op: What you should know before your surgery  Mid-urethral sling for stress urinary incontinence    What you should know before your surgery    You are scheduled for surgery to fix your stress urinary incontinence using a small piece of lightweight mesh. This surgery is minimally-invasive, meaning that all incisions are small and hidden inside the vagina. The documents in this packet will outline each part of the surgery.     Goals of surgery: To improve leakage of urine that occurs with increased abdominal pressure, such as from coughing, sneezing, laughing, exercise, sex. Long-term, 70-80% of women report satisfactory symptom control after a sling procedure. Your personal risk of recurrence/retreatment is based on multiple factors including, genetics, urgency symptoms, body weight, smoking, frequent heavy lifting, and future vaginal deliveries.     Sexual function: Studies have not shown a significant change in sexual function after a sling procedure. However, surgery involves the cutting and re-sewing of the vaginal tissues. Scar tissue formation after surgery can lead to painful sex for some patients. In many patients, this new pain goes away over time or can be improved with pelvic physical therapy, lubrication, dilators, or vaginal estrogen.     Bladder urgency after surgery:  Symptoms of bladder urgency (feeling of having to rush to the bathroom) and frequency (urinating very often) can change after sling surgery. This varies among patients. Sling surgery can lead to urgency/frequency getting better, getting worse, or staying the same as before surgery.  We will follow your symptoms after surgery, and develop a treatment plan if this turns into a bothersome problem.    Risk of postoperative pain:  Pain after surgery is a normal part of the healing process, but usually well-tolerated. Common areas of pain include the pelvis and vagina, often related to positioning. Try changing your position when sitting  or resting in bed to relieve the pain. Expect to have some pain when you are discharged home. This should improve with time and with use of medications. See “after surgery” instructions for more details on pain control.     General risks of pelvic reconstructive surgery: Specific risks for your procedure are discussed separately    Anesthesia: With modern anesthetics and monitoring equipment, complications due to anesthesia are very rare. Your anesthesiologist will discuss what will be most suitable for you on the day of surgery  Bleeding: all surgery leads to bleeding, however this surgery usually amounts to blood loss between a tablespoon and a small cup.  Large amount of blood loss that require a blood transfusion are not common in sling surgery (<1%).  Blood transfusion, if needed, is safe. Minor reactions like fever or allergy occur in less than 1% of transfusions. Risks of an infection or mismatched blood is very rare (less then 1 out of every 100,000 transfusions)  Infection: The most common infection with prolapse surgery is a urinary tract infection (UTI) which is easily treated. Infection of the incisions is very uncommon. Risk factors for infections and wound complications include diabetes, smoking, obesity and other chronic illnesses.  Blood Clots: Clots in the blood vessels of the legs/lungs are potentially dangerous and can occur in patients undergoing this type of surgery. This is prevented with leg compression during surgery, and sometimes an injected blood thinner. Staying mobile after surgery is strongly encouraged in all patients, and is  important in preventing clots.  Damage to surrounding organs. The pelvic organs are all very close together. The risk of damage to other organs increases if you have had prior pelvic surgeries, or a prior history of endometriosis, or pelvic infection. These all lead to formation of scar tissue in the pelvis which can cause organs to stick together, making the  surgery more difficult.    Ureter and bladder damage: The ureters are tubes that carry urine from the kidneys to the bladder, and they travel very close to the uterus and vagina. The bladder is attached to both your uterus and the front of the vagina. Damage can happen during a hysterectomy (1-2% chance), or during prolapse repair procedures. A cystoscopy (camera in the bladder) is performed during your surgery to ensure there is no bladder or ureter damage. If injury occurs, it  may require a stent (drainage tube) to be placed temporarily, or in rare cases require a larger abdominal incision to repair. A bladder catheter may need to stay in place temporarily after surgery.  Bowel damage: This is uncommon during your surgery.   Vascular damage: major damage to blood vessels is uncommon. If this occurs it may require a larger surgery and/or blood transfusion.  Fistula: A fistula is an abnormal connection between the vagina, and either the bladder or rectum, leading to leakage of urine or stool. These are rare complications that arise during healing from pelvic surgery, and sometimes require additional surgeries to correct. Great care is made during your surgery to prevent these fistulas. If they do occur, your Urogynecologist is the leading expert in repairing fistulas.        Main Surgical Procedure:    Midurethral sling    (mesh sling under urethra to stop leakage)    The mid-urethral sling procedure will treat the problem of leakage of urine when you experience an increase in abdominal pressure, such as with coughing, sneezing, exercising, or laughing (stress incontinence). This procedure may also be performed if there is a high likelihood that you will develop new stress incontinence after a prolapse repair.    After you go to sleep, the lightweight mesh sling is placed through a small cut made in the vagina over the mid-point of the urethra. Through this cut, the two ends of the sling are passed with a special  needle from the vagina, along either side of the urethra, exiting through two very small cuts made just above the pubic bone in the hairline (retropubic sling). Sometimes, the sling is passed through the groin area (trans-obturator sling). The surgeon will then use a camera (cystoscope) to check that the sling is correctly positioned and not sitting within the bladder. The sling is then adjusted so that it sits loosely underneath the urethra. The vaginal skin is then stitched to cover the sling. The ends of the sling are cut off below the skin and the incision closed with skin glue or bandages.    This urinary incontinence mesh is not the same kind of vaginal mesh you may have heard about on television, which was removed from the market.  Your surgeon will speak to you in detail about this safer sling mesh at your visit.      Risks of this particular procedure include:  Difficulty emptying your bladder requiring a catheter is common immediately after surgery (20-50%). This usually resolves within 1 week. Most patients who can't urinate immediately after surgery will return to the office in 3-5 days to test the bladder again and remove the catheter. Longer-term difficulty passing urine may occur in 1-5% of patients. During this time your doctor may recommend a fine tube or catheter be used to drain the bladder. If your urine stream remains very slow or you cannot empty the bladder well even after the swelling has settled, your provider will discuss other possibilities, such as cutting or loosening the sling.  Sling exposure. Occasionally the sling can appear in the wall of the vagina a few weeks, months, or years after an operation. Symptoms may include vaginal bleeding, vaginal discharge, or pain with intercourse for the patient or her partner. In such cases, you should consult your surgeon for further advice. Management would involve either vaginal estrogen cream to help the skin grow over the sling, or cutting out  the small part of the mesh coming through.  Rarely does the entire vaginal portion of the mesh need to be removed.  The arms of the mesh in the muscles cannot always be removed.  The risk of the mesh coming out into the vagina is 2-4%.  Bladder or urethral perforation. Bladder perforation occurs in 1-5% of procedures. Your surgeon will check for damage during the operation by looking inside the bladder and urethra using a cystoscope, a special camera placed into the bladder. Removing and correctly locating the needle to which the sling is attached should resolve the situation. Bladder perforation, as long as it is recognized, does not affect the success of the operation. Damage to the urethra is more difficult to deal with and should be discussed with your surgeon. This is uncommon, and the sling may not be placed if this occurs.  Pain. Long-term pain following sling surgery is unusual. Studies suggest that after the operation about 1% will develop vaginal or groin pain. In most cases pain is short lived and does not occur for more than 1 to 2 weeks. In the rare instance that the pain does not go away with physical therapy or vaginal estrogen,  the sling may need to be removed.        Post-op: What to expect after your surgery      Recovery room  You can expect to stay in the recovery room for a few hours until you are alert. Some pain is normal after surgery but should be tolerable. Your pain will become less over the first 2 weeks. You will go home on the same day as your surgery.     Bladder function  You will wake up with a small tube (catheter) in your bladder. In some patients, a temporary catheter may need to remain in place after surgery. You will be scheduled for a follow up visit with a nurse for a bladder test a few days after your surgery. An antibiotic may be prescribed while the catheter is in place to decrease the risk of infection.    The sling is designed to stop leakage of urine when you strain. It  is important after surgery not to try to empty your bladder by straining or pushing. You may need to change your position (such as leaning forward or backward) to empty your bladder.     Call the surgeon for treatment, if you have signs and symptoms of a urinary tract infection, including:  Burning with urination  Bladder pain  Worsening need to urinate right away  Urine with a bad smell    Vaginal care   Do not go swimming, take sitting baths, and have sexual intercourse  for 6 weeks after surgery. Do not place anything in your vagina except vaginal estrogen cream, if instructed to do so by your surgeon.    Vaginal discharge and bleeding/spotting is also normal through the entire 6-week recovery. Sometimes small sutures will fall out of your vagina as they dissolve. This is normal. Contact the office with any heavy bleeding that soaks through pads, bad smelling discharge, or worsening pain.    Pain management   Surgical pain is controlled in most patients with only acetaminophen (Tylenol) and non-steroidal (NSAIDs) such as ibuprofen (Advil). These drugs can be taken together because they do not interact with each other. In the hospital, your nurse will give you these medications at regular intervals to both treat and to prevent pain. If these are not controlling your pain, you may ask the nurse for additional medication. When you go home, you will also take NSAIDs and acetaminophen for pain management. Sometimes, a short course of narcotics (oxycodone, hydromorphone) is required. We do not recommend using narcotics regularly as it can lead to constipation or dizziness and falls.     Do not drive or operate heavy machinery while using narcotics. You are unlikely to become addicted if you need to take a narcotic medication a few times within the first week of your surgery.  After the first few days to one week, your pain should decrease and you should not have pain severe enough to need narcotics. If you continue  having severe pain, contact your surgeon for re-evaluation.     Incision care  The small incisions above your pubic bone or in the groin, will be closed with either small bandages or a surgical glue.     You can shower with these in place. In shower, let the soapy warm water run over you incisions. Do not scrub or wipe you incisions. Keep the incisions dry for the remainder of the day/night. The bandages will fall off on their own, or you can remove them after at least 3 days if they become discolored or dirty. The glue will also fall off on its own after a few weeks.     Call us if you feel increasing pain or warmth at the incision. Also, call us if you see increased redness or discharge (pus) at the incision.    Activity restrictions  During the first 6 weeks, avoid any type of heavy lifting that requires straining. Gentle walking is good exercise. Start with about 10 minutes a day when you feel ready and build up gradually. Avoid repetitive squatting or bending at the waist. Avoid any fitness-type training, aerobics, etc. for at least 6 weeks after surgery. Generally, you will need a few days off work. This period may be longer if you have a very physical job or if specifically recommended by your surgeon.     Return to sex  When your surgeon clears you to resume sex (if desired), begin slowly and use enough lubrication to help ease the discomfort. As scar tissue softens over time, you will feel less discomfort. If discomfort does not go away, contact the office to schedule an exam and discuss other options. In some cases, your surgeon may prescribe a low dose of vaginal estrogen to help with vaginal dryness and pain that may happen with sex

## 2024-04-02 ENCOUNTER — OFFICE VISIT (OUTPATIENT)
Dept: URGENT CARE | Facility: PHYSICIAN GROUP | Age: 54
End: 2024-04-02
Payer: COMMERCIAL

## 2024-04-02 VITALS
OXYGEN SATURATION: 96 % | SYSTOLIC BLOOD PRESSURE: 132 MMHG | RESPIRATION RATE: 16 BRPM | DIASTOLIC BLOOD PRESSURE: 80 MMHG | HEART RATE: 86 BPM | WEIGHT: 140 LBS | TEMPERATURE: 98 F | BODY MASS INDEX: 23.9 KG/M2 | HEIGHT: 64 IN

## 2024-04-02 DIAGNOSIS — J30.9 ALLERGIC RHINITIS, UNSPECIFIED SEASONALITY, UNSPECIFIED TRIGGER: ICD-10-CM

## 2024-04-02 DIAGNOSIS — J45.41 MODERATE PERSISTENT ASTHMA WITH EXACERBATION: ICD-10-CM

## 2024-04-02 PROCEDURE — 3079F DIAST BP 80-89 MM HG: CPT | Performed by: NURSE PRACTITIONER

## 2024-04-02 PROCEDURE — 3075F SYST BP GE 130 - 139MM HG: CPT | Performed by: NURSE PRACTITIONER

## 2024-04-02 PROCEDURE — 99213 OFFICE O/P EST LOW 20 MIN: CPT | Performed by: NURSE PRACTITIONER

## 2024-04-02 RX ORDER — AZELASTINE 1 MG/ML
1 SPRAY, METERED NASAL 2 TIMES DAILY
Qty: 30 ML | Refills: 0 | Status: SHIPPED | OUTPATIENT
Start: 2024-04-02

## 2024-04-02 RX ORDER — MONTELUKAST SODIUM 10 MG/1
TABLET ORAL
Qty: 90 TABLET | Refills: 0 | Status: SHIPPED | OUTPATIENT
Start: 2024-04-02

## 2024-04-02 RX ORDER — PREDNISONE 10 MG/1
TABLET ORAL
Qty: 33 TABLET | Refills: 0 | Status: SHIPPED | OUTPATIENT
Start: 2024-04-02 | End: 2024-04-15

## 2024-04-03 NOTE — PROGRESS NOTES
"Subjective:   Dasia Covarrubias is a 53 y.o. female who presents for Cough (Had flu symptoms 3 weeks ago, did steroids and antibiotics last week. Having congestion and SOB from asthma. Popping in ears. Coughing but nothing coming up. )    Patient is a 53-year-old female presents clinic today with 3-week history of shortness of breath, wheezing, and dry cough.  Over the past week she has noticed bilateral ear popping.  She states 3 weeks ago she did have URI/flulike symptoms which overall did resolve however that her asthma symptoms have lingered.  She does have a mild clear runny nose but does have history of chronic allergies.  She has not had any fever, chills, chest pain, headaches, or dizziness.  Patient has been using her albuterol inhaler and multiple over-the-counter cold and flu medications and cough medications with out any symptom improvement.  She did call a TeleDoc just over a week ago and was placed on doxycycline and Medrol Dosepak however symptoms did not improve.    Medications, Allergies, and current problem list reviewed today in Epic.     Objective:     /80   Pulse 86   Temp 36.7 °C (98 °F) (Temporal)   Resp 16   Ht 1.626 m (5' 4\")   Wt 63.5 kg (140 lb)   SpO2 96%     Physical Exam  Vitals reviewed.   Constitutional:       General: She is not in acute distress.     Appearance: Normal appearance. She is not ill-appearing or toxic-appearing.   HENT:      Head: Normocephalic.      Right Ear: Tympanic membrane, ear canal and external ear normal.      Left Ear: Tympanic membrane, ear canal and external ear normal.      Nose: Rhinorrhea present. No congestion.      Mouth/Throat:      Mouth: Mucous membranes are moist.      Pharynx: No oropharyngeal exudate or posterior oropharyngeal erythema.   Eyes:      Extraocular Movements: Extraocular movements intact.      Conjunctiva/sclera: Conjunctivae normal.      Pupils: Pupils are equal, round, and reactive to light.   Cardiovascular: "      Rate and Rhythm: Normal rate and regular rhythm.   Pulmonary:      Effort: Pulmonary effort is normal. No respiratory distress.      Breath sounds: No stridor. Wheezing present. No rhonchi or rales.      Comments: Bronchospastic cough  Chest:      Chest wall: No tenderness.   Musculoskeletal:         General: Normal range of motion.      Cervical back: Normal range of motion and neck supple.   Skin:     General: Skin is warm and dry.   Neurological:      Mental Status: She is alert and oriented to person, place, and time.   Psychiatric:         Mood and Affect: Mood normal.         Behavior: Behavior normal.         Thought Content: Thought content normal.         Judgment: Judgment normal.         Assessment/Plan:     Diagnosis and associated orders:     1. Moderate persistent asthma with exacerbation  predniSONE (DELTASONE) 10 MG Tab    montelukast (SINGULAIR) 10 MG Tab      2. Allergic rhinitis, unspecified seasonality, unspecified trigger  azelastine (ASTELIN) 137 MCG/SPRAY nasal spray         Comments/MDM:     Is a chronic condition with acute exacerbation of moderate persistent asthma and allergy symptoms.  Patient is non-ill-appearing and in no acute distress.  Vital signs all within normal range.  Lung sounds show scattered wheezing throughout with bronchospastic cough.  Clear rhinorrhea present.  No signs of secondary infection at this time.  Will go ahead and place on prednisone taper, montelukast, and Astelin nasal spray.  Side effects medication were discussed.  Patient will continue with albuterol inhaler.  No refills needed at this time.  Recommended if symptoms or not improving in the next 3 days she should follow-up in the emergency department or if they acutely worsen.  Patient was involved with shared decision-making throughout the exam today and verbalizes understanding regards to plan of care, discharge instructions, and follow-up         Differential diagnosis, natural history, supportive  care, and indications for immediate follow-up discussed.    Advised the patient to follow-up with the primary care physician for recheck, reevaluation, and consideration of further management.    I personally reviewed prior external notes and test results pertinent to today's visit as well as additional imaging and testing completed in clinic today.     Please note that this dictation was created using voice recognition software. I have made a reasonable attempt to correct obvious errors, but I expect that there are errors of grammar and possibly content that I did not discover before finalizing the note.

## 2024-05-02 PROBLEM — M23.41 LOOSE BODY IN KNEE, RIGHT KNEE: Status: ACTIVE | Noted: 2024-05-02

## 2024-05-26 DIAGNOSIS — G43.909 MIGRAINE WITHOUT STATUS MIGRAINOSUS, NOT INTRACTABLE, UNSPECIFIED MIGRAINE TYPE: ICD-10-CM

## 2024-06-03 ENCOUNTER — APPOINTMENT (OUTPATIENT)
Dept: RADIOLOGY | Facility: MEDICAL CENTER | Age: 54
End: 2024-06-03
Payer: COMMERCIAL

## 2024-06-03 ENCOUNTER — HOSPITAL ENCOUNTER (OUTPATIENT)
Facility: MEDICAL CENTER | Age: 54
End: 2024-06-03
Attending: PHYSICIAN ASSISTANT
Payer: COMMERCIAL

## 2024-06-03 ENCOUNTER — OFFICE VISIT (OUTPATIENT)
Dept: URGENT CARE | Facility: PHYSICIAN GROUP | Age: 54
End: 2024-06-03
Payer: COMMERCIAL

## 2024-06-03 VITALS
BODY MASS INDEX: 22.71 KG/M2 | OXYGEN SATURATION: 97 % | HEIGHT: 64 IN | HEART RATE: 66 BPM | TEMPERATURE: 98.2 F | DIASTOLIC BLOOD PRESSURE: 78 MMHG | RESPIRATION RATE: 16 BRPM | SYSTOLIC BLOOD PRESSURE: 122 MMHG | WEIGHT: 133 LBS

## 2024-06-03 DIAGNOSIS — G44.89 OTHER HEADACHE SYNDROME: ICD-10-CM

## 2024-06-03 DIAGNOSIS — R30.0 DYSURIA: ICD-10-CM

## 2024-06-03 DIAGNOSIS — G43.009 MIGRAINE WITHOUT AURA AND WITHOUT STATUS MIGRAINOSUS, NOT INTRACTABLE: ICD-10-CM

## 2024-06-03 DIAGNOSIS — R29.90 UNSPECIFIED SYMPTOMS AND SIGNS INVOLVING THE NERVOUS SYSTEM: ICD-10-CM

## 2024-06-03 DIAGNOSIS — R10.2 PELVIC PAIN: ICD-10-CM

## 2024-06-03 LAB
APPEARANCE UR: CLEAR
BILIRUB UR STRIP-MCNC: NORMAL MG/DL
COLOR UR AUTO: YELLOW
GLUCOSE UR STRIP.AUTO-MCNC: NORMAL MG/DL
KETONES UR STRIP.AUTO-MCNC: NORMAL MG/DL
LEUKOCYTE ESTERASE UR QL STRIP.AUTO: NORMAL
NITRITE UR QL STRIP.AUTO: NORMAL
PH UR STRIP.AUTO: 5.5 [PH] (ref 5–8)
PROT UR QL STRIP: NORMAL MG/DL
RBC UR QL AUTO: NORMAL
SP GR UR STRIP.AUTO: 1.02
UROBILINOGEN UR STRIP-MCNC: 0.2 MG/DL

## 2024-06-03 PROCEDURE — 99214 OFFICE O/P EST MOD 30 MIN: CPT | Performed by: PHYSICIAN ASSISTANT

## 2024-06-03 PROCEDURE — 3078F DIAST BP <80 MM HG: CPT | Performed by: PHYSICIAN ASSISTANT

## 2024-06-03 PROCEDURE — 70551 MRI BRAIN STEM W/O DYE: CPT

## 2024-06-03 PROCEDURE — 3074F SYST BP LT 130 MM HG: CPT | Performed by: PHYSICIAN ASSISTANT

## 2024-06-03 PROCEDURE — 81002 URINALYSIS NONAUTO W/O SCOPE: CPT | Performed by: PHYSICIAN ASSISTANT

## 2024-06-03 PROCEDURE — 87086 URINE CULTURE/COLONY COUNT: CPT

## 2024-06-03 ASSESSMENT — ENCOUNTER SYMPTOMS
FEVER: 0
CHILLS: 0
ABDOMINAL PAIN: 1
BACK PAIN: 1

## 2024-06-04 NOTE — PROGRESS NOTES
Subjective:   Dasia Covarrubias is a 54 y.o. female who presents today with   Chief Complaint   Patient presents with    UTI     X 1 week     UTI  This is a new problem. The current episode started in the past 7 days. The problem occurs constantly. The problem has been unchanged. Associated symptoms include abdominal pain (pelvic pain). Pertinent negatives include no chills or fever. She has tried NSAIDs for the symptoms. The treatment provided mild relief.     Patient states she has had a history of kidney stones and yeast infections in the past and this does not feel similar to that.  Patient states she has felt lower abdominal cramping similar to when she has had.  In the past but states she has not had a period in several years.    PMH:  has a past medical history of Allergic asthma (05/05/2011), Anesthesia, Calcium nephrolithiasis (05/05/2011), Heart burn, High cholesterol, Insomnia (05/05/2011), Kidney stones, mixed calcium oxalate (01/01/2008), PMDD (premenstrual dysphoric disorder) (05/05/2011), PONV (postoperative nausea and vomiting), Pure hypercholesterolemia (05/05/2011), and Unspecified vitamin D deficiency (06/02/2011).  MEDS:   Current Outpatient Medications:     busPIRone (BUSPAR) 10 MG Tab tablet, , Disp: , Rfl:     ROPINIRole (REQUIP) 0.5 MG Tab, , Disp: , Rfl:     montelukast (SINGULAIR) 10 MG Tab, 1 TAB BY MOUTH ONCE A DAY ONLY IF NEEDED FOR allergies and asthma, Disp: 90 Tablet, Rfl: 0    azelastine (ASTELIN) 137 MCG/SPRAY nasal spray, Administer 1 Spray into affected nostril(S) 2 times a day., Disp: 30 mL, Rfl: 0    indomethacin (INDOCIN) 50 MG Cap, Take 1 Capsule by mouth 3 times a day., Disp: 15 Capsule, Rfl: 0    aspirin 81 MG EC tablet, Take 1 Tablet by mouth 2 times a day. Start taking the morning after surgery., Disp: 28 Tablet, Rfl: 0    cetirizine (ZYRTEC) 10 MG Tab, Take 10 mg by mouth every day., Disp: , Rfl:     atorvastatin (LIPITOR) 40 MG Tab, Take 40 mg by mouth every  day., Disp: , Rfl:     fluoxetine (PROZAC) 10 MG CAPS, TAKE 1 TO 3 CAPSULES ORALLY DAILY AS NEEDED FOR PMDD, Disp: 90 Cap, Rfl: 0    albuterol (VENTOLIN HFA) 108 (90 BASE) MCG/ACT AERS, Inhale 2 Puffs by mouth every 6 hours as needed for Shortness of Breath., Disp: 1 Inhaler, Rfl: 3    Cholecalciferol (CVS VITAMIN D) 2000 UNIT CAPS, Take 1 Cap by mouth every bedtime. For vitamin D deficiency, Disp: 30 Cap, Rfl: 11  ALLERGIES:   Allergies   Allergen Reactions    Penicillins Rash and Hives    Sulfa Drugs Rash and Hives     SURGHX:   Past Surgical History:   Procedure Laterality Date    PB KNEE SCOPE,REMV LOOSE BODY Right 5/10/2024    Procedure: RIGHT KNEE ARTHROSCOPY, RIGHT LOOSE BODY REMOVAL, SYNOVECTOMY;  Surgeon: Artie Cummins M.D.;  Location: [facility] [place];  Service: Orthopedics    PB KNEE SCOPE,DIAGNOSTIC Right 5/3/2023    Procedure: RIGHT KNEE ARTHROSCOPY,  SYNOVECTOMY;  Surgeon: Artie Cummins M.D.;  Location: SURGERY [facility];  Service: Orthopedics    SYNOVECTOMY Right 5/3/2023    Procedure: SYNOVECTOMY;  Surgeon: Artie Cummins M.D.;  Location: SURGERY [facility];  Service: Orthopedics    KNEE ARTHROSCOPY Left 2020    KNEE ARTHROSCOPY Right 12/11/2019    Procedure: RT KNEE ARTHROSCOPY LOOSE BODY REMOVAL, EXTENSIVE SYNOVECTOMY, CHONDROPLASTY;  Surgeon: Eliceo Palmer M.D.;  Location: [facility];  Service: Orthopedics    FOOT SURGERY Bilateral     Remove loos body 2022    STONE RETRIEVAL      for kidney stone x2 has had both left and right    TUBAL LIGATION       SOCHX:  reports that she has never smoked. She has never used smokeless tobacco. She reports current alcohol use of about 0.5 oz of alcohol per week. She reports that she does not use drugs.  FH: Reviewed with patient, not pertinent to this visit.       Review of Systems   Constitutional:  Negative for chills and fever.   Gastrointestinal:  Positive for abdominal pain (pelvic pain).   Genitourinary:   "Positive for dysuria.   Musculoskeletal:  Positive for back pain.        Objective:   /78   Pulse 66   Temp 36.8 °C (98.2 °F)   Resp 16   Ht 1.626 m (5' 4\")   Wt 60.3 kg (133 lb)   SpO2 97%   BMI 22.83 kg/m²   Physical Exam  Vitals and nursing note reviewed.   Constitutional:       General: She is not in acute distress.     Appearance: Normal appearance. She is well-developed. She is not ill-appearing or toxic-appearing.   HENT:      Head: Normocephalic and atraumatic.      Right Ear: Hearing normal.      Left Ear: Hearing normal.   Cardiovascular:      Rate and Rhythm: Normal rate.   Pulmonary:      Effort: Pulmonary effort is normal.   Abdominal:      Tenderness: There is abdominal tenderness (mild) in the suprapubic area. There is no right CVA tenderness or left CVA tenderness.   Genitourinary:     Comments: Patient deferred  exam  Musculoskeletal:      Comments: Normal movement in all 4 extremities   Skin:     General: Skin is warm and dry.   Neurological:      Mental Status: She is alert.      Coordination: Coordination normal.   Psychiatric:         Mood and Affect: Mood normal.         UA trace blood. No signs of infection    Assessment/Plan:   Assessment    1. Dysuria  - POCT Urinalysis  - URINE CULTURE(NEW); Future    2. Pelvic pain  - US-PELVIC COMPLETE (TRANSABDOMINAL/TRANSVAGINAL) (COMBO); Future    No findings on exam or be suggestive of kidney infection or kidney stones.  Patient has no concerns for vaginal pathogen or STD testing at this time.  Recommend ultrasound and would also suggest follow-up with OB/GYN who she plans on seeing.  Given late presentation unable to schedule stat ultrasound tonight and gave patient ER precautions with any new or worsening symptoms.    Differential diagnosis, natural history, supportive care, and indications for immediate follow-up discussed.   Patient given instructions and understanding of medications and treatment.    If not improving in 3-5 days, " F/U with PCP or return to UC if symptoms worsen.    Patient agreeable to plan.        Please note that this dictation was created using voice recognition software. I have made every reasonable attempt to correct obvious errors, but I expect that there are errors of grammar and possibly content that I did not discover before finalizing the note.    Dinh De Luna PA-C

## 2024-06-06 ENCOUNTER — HOSPITAL ENCOUNTER (OUTPATIENT)
Dept: RADIOLOGY | Facility: MEDICAL CENTER | Age: 54
End: 2024-06-06
Attending: PHYSICIAN ASSISTANT
Payer: COMMERCIAL

## 2024-06-06 ENCOUNTER — RESEARCH ENCOUNTER (OUTPATIENT)
Dept: RESEARCH | Facility: MEDICAL CENTER | Age: 54
End: 2024-06-06

## 2024-06-06 DIAGNOSIS — R10.2 PELVIC PAIN: ICD-10-CM

## 2024-06-06 DIAGNOSIS — Z00.6 RESEARCH STUDY PATIENT: ICD-10-CM

## 2024-06-06 LAB
BACTERIA UR CULT: NORMAL
SIGNIFICANT IND 70042: NORMAL
SITE SITE: NORMAL
SOURCE SOURCE: NORMAL

## 2024-06-06 PROCEDURE — 76830 TRANSVAGINAL US NON-OB: CPT

## 2024-06-07 ENCOUNTER — APPOINTMENT (OUTPATIENT)
Dept: ADMISSIONS | Facility: MEDICAL CENTER | Age: 54
End: 2024-06-07
Payer: COMMERCIAL

## 2024-06-09 NOTE — PROCEDURES
Procedure note: Complex urodynamic testing    Procedure performed:    -     85395 Complex Uroflowmetry  01276 Complex CMG w/ voiding pressure study AND urethral pressure  56907 EMG studies anal or urethral sphincter   14923 Intraabdominal catheter       Indication: Dasia Covarrubias is a 54 y.o. year old with Urinary incontinence, mixed, stress predominant. History of suburethral sling procedure.     Bladder symptoms:              Voids per day: 10+      Voids per night: 1                 Urinary incontinence episodes per day: 5-6               Urge leakage:  On Movement to Bathroom and Full Bladder              Stress leakage: With Cough, With Laugh, With Exercise, With [place], With Bending/Squatting, and Upon Standing              Stress> urge              Continuous / insensible urine loss: No               Nocturnal enuresis: No               Leakage volume: Moderate              Number of pads/day: 3-4               Bladder emptying: Incomplete              Voiding symptoms: Hesitancy, Post-Void Dribble, Weak Stream, and Double or Triple Voiding              UTI in last 12 months: yes              Other urologic history: h/o stones    She presents for complex urodynamic testing today to fully elucidate her bladder function and symptom pathophysiology, in order to guide further treatment, and to evaluate if an anti-incontinence procedure is indicated.     Verbal consent was obtained after review of risk and benefit.     Chaperone:  Nilam Spears MA    Procedure: The patient was taken to the urodynamic suite and placed in the urodynamic chair. She underwent sterile prep with betadine prior to catheterization. There was a negative urinalysis. Air-charged catheters were placed in the urethra/bladder and vagina. Urodynamics were performed using routine techniques. There were no complications.     Antibiotic given: None    Urodynamic findings:     Preliminary Uroflometry     Flow pattern:  Continuous  Maximum flow: 17 mL/sec  Average flow: 7.9 mL/sec  Voided volume: 237 mL  Post-void residual: 23 mL  Flow time: 29 sec    Filling cystometrogram    First sensation: 26 mL  First desire: 212 mL  Strong Desire: 262 mL  Urodynamic capacity: 409 mL  Stress leakage: Yes  Uninhibited detrusor contractions present: Yes  Leakage with DO: No  Leak point pressures  At 150mL volume: 107 cm H2O, leak noted with cough only  At 300mL volume: 82 cm H2O, leak noted with cough only  Compliance: Normal    Urethral pressure profile    Maximum urethral closing pressure (MUCP): 77 cm H2O  Morphology: Normal    Pressure voiding study    The patient's voiding mechanism was accomplished by detrusor contraction   Max flow: 21 mL/sec  Average flow: 8.8 mL/sec  Post-void residual: 0 mL  Pdet at peak flow: 11 cm H2O  Flow time: 53 sec  Pelvic floor EMG silenced during voiding: Yes    Pelvic floor EMG: Normal       UDS procedure performed by ROSELYN Mark RNFA      Assessment:     She has completed urodynamic testing, which was uncomplicated.     Filling phase: Normal capacity and compliance.  Stress incontinence demonstrated at half and full bladder capacity with lower leak point pressure towards capacity.  Some uninhibited detrusor contraction without leak  Voiding phase: Complete emptying via detrusor contraction    Plan:   She was counseled on urodynamic findings  See office encounter for full procedural counseling  Counseled on normal post-UDS symptoms including burning and possible hematuria. If this persists after 2 days she should call or send [employer] message.         Anabella Armas MD, FACOG    Female Pelvic Medicine and Reconstructive Surgery  Department of Obstetrics and Gynecology  [facility]  [facility]

## 2024-06-11 NOTE — PROCEDURES
Procedure note: Cystourethroscopy    Procedure performed: Cystourethroscopy (51122)      Indication: Dasia Covarrubias is a 54 y.o.  with voiding dysfunction and h/p prior sling procedure. She presents for office diagnostic cystourethroscopy testing today to r/o mesh exposure and to fully elucidate her bladder structure or underlying cause for symptoms. Written consent was obtained after review of risk and benefit.       Cystoscopy    Date/Time: 6/11/2024 4:04 PM    Performed by: Anabella Armas M.D.  Authorized by: Anabella Armas M.D.    Chaperone present: yes    Timeout: timeout called immediately prior to procedure    Prep: patient was prepped and draped in usual sterile fashion    Prep type: Betadine    Anesthesia: local anesthesia      Procedure Details     Cystoscope type: flexible    Cystoscopy route: transurethral      Irrigation used: saline      Position: dorsal lithotomy    Urethra     Urethra: normal      Vagina     Vagina: normal      Bladder     Bladder: normal      Bladder comment: No mesh exposures noted. Normal urothelium.     Post-Procedure Details     Appearance of urine after procedure: clear    Outcome: patient tolerated procedure well with no complications      Post-procedure interventions: post-procedure instructions given      Disposition: discharged home in satisfactory condition        Anabella Armas MD

## 2024-06-12 ENCOUNTER — PRE-ADMISSION TESTING (OUTPATIENT)
Dept: ADMISSIONS | Facility: MEDICAL CENTER | Age: 54
End: 2024-06-12
Attending: STUDENT IN AN ORGANIZED HEALTH CARE EDUCATION/TRAINING PROGRAM
Payer: COMMERCIAL

## 2024-06-13 ENCOUNTER — PROCEDURE VISIT (OUTPATIENT)
Dept: GYNECOLOGY | Facility: CLINIC | Age: 54
End: 2024-06-13
Payer: COMMERCIAL

## 2024-06-13 VITALS
HEART RATE: 66 BPM | BODY MASS INDEX: 22.83 KG/M2 | DIASTOLIC BLOOD PRESSURE: 80 MMHG | SYSTOLIC BLOOD PRESSURE: 122 MMHG | WEIGHT: 133 LBS

## 2024-06-13 DIAGNOSIS — N83.202 BILATERAL OVARIAN CYSTS: ICD-10-CM

## 2024-06-13 DIAGNOSIS — N39.46 URINARY INCONTINENCE, MIXED: ICD-10-CM

## 2024-06-13 DIAGNOSIS — Z80.41 FAMILY HISTORY OF OVARIAN CANCER: ICD-10-CM

## 2024-06-13 DIAGNOSIS — R10.2 PELVIC PAIN: ICD-10-CM

## 2024-06-13 DIAGNOSIS — Z98.890 HISTORY OF SUBURETHRAL SLING PROCEDURE: ICD-10-CM

## 2024-06-13 DIAGNOSIS — N83.201 BILATERAL OVARIAN CYSTS: ICD-10-CM

## 2024-06-13 LAB
APPEARANCE UR: CLEAR
BILIRUB UR STRIP-MCNC: NEGATIVE MG/DL
COLOR UR AUTO: YELLOW
GLUCOSE UR STRIP.AUTO-MCNC: NEGATIVE MG/DL
KETONES UR STRIP.AUTO-MCNC: NEGATIVE MG/DL
LEUKOCYTE ESTERASE UR QL STRIP.AUTO: NEGATIVE
NITRITE UR QL STRIP.AUTO: NEGATIVE
PH UR STRIP.AUTO: 6 [PH] (ref 5–8)
PROT UR QL STRIP: NEGATIVE MG/DL
RBC UR QL AUTO: NEGATIVE
SP GR UR STRIP.AUTO: 1.02
UROBILINOGEN UR STRIP-MCNC: 0.2 MG/DL

## 2024-06-13 PROCEDURE — 51741 ELECTRO-UROFLOWMETRY FIRST: CPT | Performed by: STUDENT IN AN ORGANIZED HEALTH CARE EDUCATION/TRAINING PROGRAM

## 2024-06-13 PROCEDURE — 81002 URINALYSIS NONAUTO W/O SCOPE: CPT | Performed by: NURSE PRACTITIONER

## 2024-06-13 PROCEDURE — 99214 OFFICE O/P EST MOD 30 MIN: CPT | Mod: 25 | Performed by: STUDENT IN AN ORGANIZED HEALTH CARE EDUCATION/TRAINING PROGRAM

## 2024-06-13 PROCEDURE — 51784 ANAL/URINARY MUSCLE STUDY: CPT | Performed by: STUDENT IN AN ORGANIZED HEALTH CARE EDUCATION/TRAINING PROGRAM

## 2024-06-13 PROCEDURE — 51729 CYSTOMETROGRAM W/VP&UP: CPT | Performed by: STUDENT IN AN ORGANIZED HEALTH CARE EDUCATION/TRAINING PROGRAM

## 2024-06-13 PROCEDURE — 52000 CYSTOURETHROSCOPY: CPT | Performed by: STUDENT IN AN ORGANIZED HEALTH CARE EDUCATION/TRAINING PROGRAM

## 2024-06-13 PROCEDURE — 51797 INTRAABDOMINAL PRESSURE TEST: CPT | Performed by: STUDENT IN AN ORGANIZED HEALTH CARE EDUCATION/TRAINING PROGRAM

## 2024-06-13 NOTE — PROGRESS NOTES
Urogynecology & Reconstructive Pelvic Surgery - Follow Up    Dasia Covarrubias MRN:[id number] :1970    Referred by: Trinity Mayo DO    Reason for Visit:   Chief Complaint   Patient presents with    Procedure     UDS + cysto          Subjective     History of Presenting Illness:    Dasia Covarrubias is a 54 y.o. year old P3 with urinary incontinence presents for follow-up.    She underwent urodynamic testing today which confirmed a stress predominance of her incontinence with average to low leak point pressures, incomplete bladder emptying.  She opts to move forward with the sling procedure as scheduled.    She also underwent an ultrasound recently for increasing pelvic and lower back pain.  Ovarian cyst were seen on ultrasound ordered by urgent care.  These are 1.4 and 3.1 cm in size.  These are less likely to be a cause for her pain when compared to her back issues, for which she undergoes regular epidural treatments.  She does have a family history of ovarian cancer      Initial HPI: She presents for the evaluation and management of recurrent urinary incontinence.     She is significantly bothered by urinary incontinence, multiple times per day requiring pads.  This is usually with activities including exercise, cough, sneeze, laugh, movement.  She also has some urgency and frequency, going up to 10 times per day, and some urge leakages.  When asked, her stress type urinary symptoms are more bothersome than urge.    He has history of a prior mid urethral sling in , unsure if this is transobturator or retropubic type, and this helped her stress incontinence for a few years but slowly worsened.  Her symptoms are now worse than before her last sling.    She had been started on vaginal estrogen tablets by her gynecologist, and has used these for 1 year.  She had some concerns about breast cancer, which were reassured today    Prior Pelvic surgery:    bilateral tubal ligation  ([facility])  2010 sling, unsure which type, op report not available     Prior treatment:   vaginal estrogen tablet - 2x weekly    Pelvic floor symptom review:     Bladder:   Voids per day: 10+ Voids per night: 1      Urinary incontinence episodes per day: 5-6    Urge leakage:  On Movement to Bathroom and Full Bladder   Stress leakage: With Cough, With Laugh, With Exercise, With [place], With Bending/Squatting, and Upon Standing   Stress> urge   Continuous / insensible urine loss: No    Nocturnal enuresis: No    Leakage volume: Moderate   Number of pads/day: 3-4    Bladder emptying: Incomplete   Voiding symptoms: Hesitancy, Post-Void Dribble, Weak Stream, and Double or Triple Voiding   UTI in last 12 months: yes   Other urologic history: h/o stones      Prolapse:     Prolapse symptoms: None     Bowel:    Constipation: some   Bowel movements per day: 4/week    Straining to empty bowels: No    Splinting to evacuate: No    Painful evacuation: No    Difficulty emptying rectum: No    Incontinence to stool: 1-2 month, passive, staining, looser stool   Blood in stool: No    Hemorrhoids: No        Sexual function:    Sexually active: No    Gender of partners: Male   Pain with intercourse: No      Past medical and surgical history    Past obstetric history   Number of vaginal deliveries: 3   Number of  deliveries: 0   History of vacuum/forceps: No    History of obstetric anal sphincter injury: No     Past gynecological history:    Last menstrual period: No LMP recorded. Patient is postmenopausal.   History of abnormal uterine bleeding: No    History of fibroids: No    History of endometrial polyps:  No    History of endometriosis: No    History of cervical dysplasia: No      Past medical history:  Past Medical History:   Diagnosis Date    Unspecified vitamin D deficiency 2011    Allergic asthma 2011    Insomnia 2011    PMDD (premenstrual dysphoric disorder) 2011    Pure hypercholesterolemia  05/05/2011    Calcium nephrolithiasis 05/05/2011    Kidney stones, mixed calcium oxalate 01/01/2008    has had 2 different time, always requiring surgery to remove    Anesthesia     motion sickness    Heart burn     High cholesterol     PONV (postoperative nausea and vomiting)      Past surgical history:  Past Surgical History:   Procedure Laterality Date    PB KNEE SCOPE,REMV LOOSE BODY Right 05/10/2024    Procedure: RIGHT KNEE ARTHROSCOPY, RIGHT LOOSE BODY REMOVAL, SYNOVECTOMY;  Surgeon: Artie Cummins M.D.;  Location: [facility] [place];  Service: Orthopedics    PB KNEE SCOPE,DIAGNOSTIC Right 05/03/2023    Procedure: RIGHT KNEE ARTHROSCOPY,  SYNOVECTOMY;  Surgeon: Artie Cummins M.D.;  Location: SURGERY [facility];  Service: Orthopedics    SYNOVECTOMY Right 05/03/2023    Procedure: SYNOVECTOMY;  Surgeon: Artie Cummins M.D.;  Location: [facility];  Service: Orthopedics    KNEE ARTHROSCOPY Left 2020    KNEE ARTHROSCOPY Right 12/11/2019    Procedure: RT KNEE ARTHROSCOPY LOOSE BODY REMOVAL, EXTENSIVE SYNOVECTOMY, CHONDROPLASTY;  Surgeon: Eliceo Palmer M.D.;  Location: [facility];  Service: Orthopedics    FOOT SURGERY Bilateral     Remove loos body 2022    OTHER  2021    My feet and knees cleared, tubal, breast ogmentation    STONE RETRIEVAL      for kidney stone x2 has had both left and right    TUBAL LIGATION       Medications:has a current medication list which includes the following prescription(s): buspirone, ropinirole, montelukast, azelastine, indomethacin, aspirin, cetirizine, atorvastatin, fluoxetine, albuterol, and cholecalciferol.  Allergies:Penicillins and Sulfa drugs  Family history:  Family History   Problem Relation Age of Onset    Cancer Mother         leukemia and carcinoid    Heart Attack Father 52        multiple heart attacks    Heart Disease Father     Hyperlipidemia Father     Hypertension Father     Cancer Sister 35        breast cancer     Cancer Maternal Grandfather         prostate cancer    Cancer Paternal Grandmother         colon cancer     Social history: reports that she has never smoked. She has never used smokeless tobacco. She reports current alcohol use of about 0.6 oz of alcohol per week. She reports that she does not use drugs.    Review of systems: A full review of systems was performed, and negative with the exception of want is noted above in the HPI.        Objective        /80 (BP Location: Right arm, Patient Position: Sitting, BP Cuff Size: Adult)   Pulse 66   Wt 133 lb   BMI 22.83 kg/m²     Physical Exam  Vitals reviewed. Exam conducted with a chaperone present.   Constitutional:       Appearance: Normal appearance.   HENT:      Head: Normocephalic.      Mouth/Throat:      Mouth: Mucous membranes are moist.   Cardiovascular:      Rate and Rhythm: Normal rate.   Pulmonary:      Effort: Pulmonary effort is normal.   Skin:     General: Skin is warm and dry.   Neurological:      Mental Status: She is alert.   Psychiatric:         Mood and Affect: Mood normal.         Procedure Performed:     Cystourethroscopy: Normal urothelium, no mesh exposure is noted    Urodynamics:  Filling phase: Normal capacity and compliance.  Stress incontinence demonstrated at half and full bladder capacity with lower leak point pressure towards capacity.  Some uninhibited detrusor contraction without leak  Voiding phase: Complete emptying via detrusor contraction    Diagnostic test and records review:      Post-void residual: See UDS  Labs: n/a    Radiology: n/a    Procedural: n/a    Documentation reviewed: Prior EMR Records         Assessment & Plan     Dasia Covarrubias is a 54 y.o. year old P3 with stress predominant mixed urinary incontinence.     Urinary incontinence, mixed, stress predominant  History of suburethral sling procedure  -Urodynamics showed stress predominant leakage.  -Cystourethroscopy without any mesh exposures or  other urothelial abnormalities  -At her prior visit as well as today we reviewed all treatment options for stress incontinence including sling (mesh and fascial), bulking, moura, nonsurgical.     After detailed counseling about all prolapse treatment options and shared decision-making, she opts for surgical treatment via  retropubic midurethral sling, and all indicated procedures. She has reviewed all preoperative written materials and expressed understanding about the procedure, risks, benefits, and recovery.    Benefits of surgery were reviewed, including functional outcomes (bladder/bowel/sexual). Risks of surgery were  also discussed including anesthesia, bleeding, infection, damage to surrounding organs (bladder, ureter, urethra, bowel, blood vessel, nerves), possible blood transfusion (rare), persistent/recurrent incontinence, mesh exposure/erosion, pain, dyspareunia, transient voiding dysfunction requiring catheterization, possible need for sling release/revision, new/worsening urinary incontinence.    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 She will likely go home on the same day as the surgery. She was counseled on what to expect in the recovery room including voiding trial, as well as what to expect if voiding trial is unsuccessful, which would be transient catheterization.   Discussed trajectory of recovery, including maximizing NSAIDs and Tylenol, and that narcotics are not routinely given.  Discussed restrictions including heavy lifting that requires straining, driving while on narcotics, nothing in the vagina and no bathing/swimming for at least 6 weeks until evaluated in the office.  Return to work discussed.  *Please see the corresponding after visit summary counseling packet for detailed counseling provided for the patient.     Pre-op meds: acetaminophen 1000mg PO, phenazopyridine  200mg PO, Cefazolin 2gm IV   Post-op medication plan: ibuprofen, tylenol   Home medication review: She should additionally hold all NSAIDs and supplements for 1 week prior to surgery.  Stop aspirin 1 week prior to surgery.     Pelvic and back pain  Bilateral ovarian cysts  Family h/o ovarian cancer  Her cyst or simple and the largest is 3 cm.  I do not think this is necessarily the explanation for her pelvic and back pain, and recommend physical therapy for this.  Cysts can be normal for her age, but given her family history, I recommend surveillance imaging.  If she has persistent or enlarging ovarian cysts it may be worthwhile considering workup with tumor markers and potential oophorectomy.  -Pelvic ultrasound ordered for 3 months    Fecal staining  Continue to recommend pelvic floor PT - referral re-sent    Genitourinary syndrome of menopause  She has vaginal atrophy on examination. Reviewed risks, benefits, and indications for vaginal estrogen therapy.  Continue with vaginal estrogen therapy twice weekly.                  Anabella Armas MD, FACOG  Urogynecology and Reconstructive Pelvic Surgery  Department of Obstetrics and Gynecology  [facility]        This medical record contains text that has been entered with the assistance of computer voice recognition and dictation software.  Therefore, it may contain unintended errors in text, spelling, punctuation, or grammar

## 2024-06-27 ENCOUNTER — OFFICE VISIT (OUTPATIENT)
Dept: URGENT CARE | Facility: PHYSICIAN GROUP | Age: 54
End: 2024-06-27
Payer: COMMERCIAL

## 2024-06-27 VITALS
SYSTOLIC BLOOD PRESSURE: 120 MMHG | WEIGHT: 136 LBS | TEMPERATURE: 97.2 F | DIASTOLIC BLOOD PRESSURE: 82 MMHG | HEART RATE: 78 BPM | OXYGEN SATURATION: 97 % | RESPIRATION RATE: 16 BRPM | BODY MASS INDEX: 23.22 KG/M2 | HEIGHT: 64 IN

## 2024-06-27 DIAGNOSIS — J45.41 MODERATE PERSISTENT ASTHMA WITH EXACERBATION: ICD-10-CM

## 2024-06-27 DIAGNOSIS — R05.2 SUBACUTE COUGH: ICD-10-CM

## 2024-06-27 PROCEDURE — 3074F SYST BP LT 130 MM HG: CPT | Performed by: FAMILY MEDICINE

## 2024-06-27 PROCEDURE — 3079F DIAST BP 80-89 MM HG: CPT | Performed by: FAMILY MEDICINE

## 2024-06-27 PROCEDURE — 99214 OFFICE O/P EST MOD 30 MIN: CPT | Performed by: FAMILY MEDICINE

## 2024-06-27 RX ORDER — PREDNISONE 20 MG/1
40 TABLET ORAL DAILY
Qty: 10 TABLET | Refills: 0 | Status: SHIPPED | OUTPATIENT
Start: 2024-06-27 | End: 2024-07-02

## 2024-06-27 RX ORDER — BECLOMETHASONE DIPROPIONATE HFA 40 UG/1
1 AEROSOL, METERED RESPIRATORY (INHALATION) 2 TIMES DAILY
Qty: 1 EACH | Refills: 1 | Status: SHIPPED | OUTPATIENT
Start: 2024-06-27 | End: 2024-07-27

## 2024-06-27 RX ORDER — ALBUTEROL SULFATE 90 UG/1
2 AEROSOL, METERED RESPIRATORY (INHALATION) EVERY 4 HOURS PRN
Qty: 1 EACH | Refills: 1 | Status: SHIPPED | OUTPATIENT
Start: 2024-06-27

## 2024-06-27 RX ORDER — DEXTROMETHORPHAN HYDROBROMIDE AND PROMETHAZINE HYDROCHLORIDE 15; 6.25 MG/5ML; MG/5ML
5 SYRUP ORAL 4 TIMES DAILY PRN
Qty: 120 ML | Refills: 0 | Status: SHIPPED | OUTPATIENT
Start: 2024-06-27

## 2024-06-28 DIAGNOSIS — J45.41 MODERATE PERSISTENT ASTHMA WITH EXACERBATION: ICD-10-CM

## 2024-06-30 ASSESSMENT — ENCOUNTER SYMPTOMS
WEIGHT LOSS: 0
VOMITING: 0
EYE REDNESS: 0
EYE DISCHARGE: 0
NAUSEA: 0
MYALGIAS: 0

## 2024-06-30 NOTE — PROGRESS NOTES
"Subjective     Dasia Covarrubias is a 54 y.o. female who presents with Cough (X 3 weeks with congestion, wheezing.  Hx of Asthma. )            3 weeks productive cough without blood in sputum. SOB and wheeze in patient with PMH asthma. Nasal congestion. No fever/chills. No other aggravating or alleviating factors.          Review of Systems   Constitutional:  Negative for malaise/fatigue and weight loss.   Eyes:  Negative for discharge and redness.   Gastrointestinal:  Negative for nausea and vomiting.   Musculoskeletal:  Negative for joint pain and myalgias.   Skin:  Negative for itching and rash.              Objective     /82   Pulse 78   Temp 36.2 °C (97.2 °F) (Temporal)   Resp 16   Ht 1.626 m (5' 4\")   Wt 61.7 kg (136 lb)   SpO2 97%   BMI 23.34 kg/m²      Physical Exam  Constitutional:       General: She is not in acute distress.     Appearance: She is well-developed.   HENT:      Head: Normocephalic and atraumatic.      Right Ear: Tympanic membrane normal.      Left Ear: Tympanic membrane normal.      Nose: Congestion present.      Mouth/Throat:      Mouth: Mucous membranes are moist.      Pharynx: No posterior oropharyngeal erythema.   Eyes:      Conjunctiva/sclera: Conjunctivae normal.   Cardiovascular:      Rate and Rhythm: Normal rate and regular rhythm.      Heart sounds: Normal heart sounds. No murmur heard.  Pulmonary:      Effort: Pulmonary effort is normal.      Breath sounds: Normal breath sounds. No wheezing.   Skin:     General: Skin is warm and dry.      Findings: No rash.   Neurological:      Mental Status: She is alert.                             Assessment & Plan        1. Moderate persistent asthma with exacerbation    - beclomethasone HFA (QVAR REDIHALER) 40 MCG/ACT inhaler; Inhale 1 Puff 2 times a day for 30 days.  Dispense: 1 Each; Refill: 1  - albuterol 108 (90 Base) MCG/ACT Aero Soln inhalation aerosol; Inhale 2 Puffs every four hours as needed for Shortness of " Breath.  Dispense: 1 Each; Refill: 1  - predniSONE (DELTASONE) 20 MG Tab; Take 2 Tablets by mouth every day for 5 days.  Dispense: 10 Tablet; Refill: 0    2. Subacute cough    - promethazine-dextromethorphan (PROMETHAZINE-DM) 6.25-15 MG/5ML syrup; Take 5 mL by mouth 4 times a day as needed for Cough.  Dispense: 120 mL; Refill: 0    Differential diagnosis, natural history, supportive care, and indications for immediate follow-up were discussed.

## 2024-07-02 DIAGNOSIS — Z00.6 RESEARCH STUDY PATIENT: ICD-10-CM

## 2024-07-05 ENCOUNTER — ANESTHESIA EVENT (OUTPATIENT)
Dept: SURGERY | Facility: MEDICAL CENTER | Age: 54
End: 2024-07-05
Payer: COMMERCIAL

## 2024-07-05 ENCOUNTER — ANESTHESIA (OUTPATIENT)
Dept: SURGERY | Facility: MEDICAL CENTER | Age: 54
End: 2024-07-05
Payer: COMMERCIAL

## 2024-07-05 ENCOUNTER — HOSPITAL ENCOUNTER (OUTPATIENT)
Facility: MEDICAL CENTER | Age: 54
End: 2024-07-05
Attending: STUDENT IN AN ORGANIZED HEALTH CARE EDUCATION/TRAINING PROGRAM | Admitting: STUDENT IN AN ORGANIZED HEALTH CARE EDUCATION/TRAINING PROGRAM
Payer: COMMERCIAL

## 2024-07-05 VITALS
WEIGHT: 133.6 LBS | TEMPERATURE: 98.6 F | HEIGHT: 64 IN | SYSTOLIC BLOOD PRESSURE: 115 MMHG | OXYGEN SATURATION: 96 % | RESPIRATION RATE: 12 BRPM | DIASTOLIC BLOOD PRESSURE: 78 MMHG | BODY MASS INDEX: 22.81 KG/M2 | HEART RATE: 73 BPM

## 2024-07-05 PROBLEM — N39.3 SUI (STRESS URINARY INCONTINENCE, FEMALE): Status: ACTIVE | Noted: 2024-07-05

## 2024-07-05 LAB — HCG UR QL: NEGATIVE

## 2024-07-05 PROCEDURE — 160035 HCHG PACU - 1ST 60 MINS PHASE I: Performed by: STUDENT IN AN ORGANIZED HEALTH CARE EDUCATION/TRAINING PROGRAM

## 2024-07-05 PROCEDURE — 160009 HCHG ANES TIME/MIN: Performed by: STUDENT IN AN ORGANIZED HEALTH CARE EDUCATION/TRAINING PROGRAM

## 2024-07-05 PROCEDURE — 700101 HCHG RX REV CODE 250: Performed by: ANESTHESIOLOGY

## 2024-07-05 PROCEDURE — 700101 HCHG RX REV CODE 250: Performed by: STUDENT IN AN ORGANIZED HEALTH CARE EDUCATION/TRAINING PROGRAM

## 2024-07-05 PROCEDURE — 160048 HCHG OR STATISTICAL LEVEL 1-5: Performed by: STUDENT IN AN ORGANIZED HEALTH CARE EDUCATION/TRAINING PROGRAM

## 2024-07-05 PROCEDURE — 700111 HCHG RX REV CODE 636 W/ 250 OVERRIDE (IP): Performed by: STUDENT IN AN ORGANIZED HEALTH CARE EDUCATION/TRAINING PROGRAM

## 2024-07-05 PROCEDURE — 160046 HCHG PACU - 1ST 60 MINS PHASE II: Performed by: STUDENT IN AN ORGANIZED HEALTH CARE EDUCATION/TRAINING PROGRAM

## 2024-07-05 PROCEDURE — 57288 REPAIR BLADDER DEFECT: CPT | Performed by: STUDENT IN AN ORGANIZED HEALTH CARE EDUCATION/TRAINING PROGRAM

## 2024-07-05 PROCEDURE — A9270 NON-COVERED ITEM OR SERVICE: HCPCS | Performed by: STUDENT IN AN ORGANIZED HEALTH CARE EDUCATION/TRAINING PROGRAM

## 2024-07-05 PROCEDURE — 160002 HCHG RECOVERY MINUTES (STAT): Performed by: STUDENT IN AN ORGANIZED HEALTH CARE EDUCATION/TRAINING PROGRAM

## 2024-07-05 PROCEDURE — 700102 HCHG RX REV CODE 250 W/ 637 OVERRIDE(OP): Performed by: STUDENT IN AN ORGANIZED HEALTH CARE EDUCATION/TRAINING PROGRAM

## 2024-07-05 PROCEDURE — 160047 HCHG PACU  - EA ADDL 30 MINS PHASE II: Performed by: STUDENT IN AN ORGANIZED HEALTH CARE EDUCATION/TRAINING PROGRAM

## 2024-07-05 PROCEDURE — 700105 HCHG RX REV CODE 258: Performed by: STUDENT IN AN ORGANIZED HEALTH CARE EDUCATION/TRAINING PROGRAM

## 2024-07-05 PROCEDURE — 160025 RECOVERY II MINUTES (STATS): Performed by: STUDENT IN AN ORGANIZED HEALTH CARE EDUCATION/TRAINING PROGRAM

## 2024-07-05 PROCEDURE — C1771 REP DEV, URINARY, W/SLING: HCPCS | Performed by: STUDENT IN AN ORGANIZED HEALTH CARE EDUCATION/TRAINING PROGRAM

## 2024-07-05 PROCEDURE — 700111 HCHG RX REV CODE 636 W/ 250 OVERRIDE (IP): Performed by: ANESTHESIOLOGY

## 2024-07-05 PROCEDURE — 160041 HCHG SURGERY MINUTES - EA ADDL 1 MIN LEVEL 4: Performed by: STUDENT IN AN ORGANIZED HEALTH CARE EDUCATION/TRAINING PROGRAM

## 2024-07-05 PROCEDURE — 81025 URINE PREGNANCY TEST: CPT

## 2024-07-05 PROCEDURE — 160036 HCHG PACU - EA ADDL 30 MINS PHASE I: Performed by: STUDENT IN AN ORGANIZED HEALTH CARE EDUCATION/TRAINING PROGRAM

## 2024-07-05 PROCEDURE — 160029 HCHG SURGERY MINUTES - 1ST 30 MINS LEVEL 4: Performed by: STUDENT IN AN ORGANIZED HEALTH CARE EDUCATION/TRAINING PROGRAM

## 2024-07-05 DEVICE — SYSTEM TRANSVAGINAL MID URETHRAL SLING ADVANTAGE FIT BLUE (1EA): Type: IMPLANTABLE DEVICE | Site: UTERUS | Status: FUNCTIONAL

## 2024-07-05 RX ORDER — PHENAZOPYRIDINE HYDROCHLORIDE 200 MG/1
200 TABLET, FILM COATED ORAL
Status: COMPLETED | OUTPATIENT
Start: 2024-07-05 | End: 2024-07-05

## 2024-07-05 RX ORDER — GENTAMICIN 40 MG/ML
INJECTION, SOLUTION INTRAMUSCULAR; INTRAVENOUS
Status: DISCONTINUED | OUTPATIENT
Start: 2024-07-05 | End: 2024-07-05 | Stop reason: HOSPADM

## 2024-07-05 RX ORDER — CEFAZOLIN SODIUM 1 G/3ML
INJECTION, POWDER, FOR SOLUTION INTRAMUSCULAR; INTRAVENOUS PRN
Status: DISCONTINUED | OUTPATIENT
Start: 2024-07-05 | End: 2024-07-05 | Stop reason: SURG

## 2024-07-05 RX ORDER — ONDANSETRON 2 MG/ML
4 INJECTION INTRAMUSCULAR; INTRAVENOUS
Status: DISCONTINUED | OUTPATIENT
Start: 2024-07-05 | End: 2024-07-05 | Stop reason: HOSPADM

## 2024-07-05 RX ORDER — EPINEPHRINE 1 MG/ML(1)
AMPUL (ML) INJECTION
Status: DISCONTINUED
Start: 2024-07-05 | End: 2024-07-05 | Stop reason: HOSPADM

## 2024-07-05 RX ORDER — HALOPERIDOL 5 MG/ML
1 INJECTION INTRAMUSCULAR
Status: DISCONTINUED | OUTPATIENT
Start: 2024-07-05 | End: 2024-07-05 | Stop reason: HOSPADM

## 2024-07-05 RX ORDER — GENTAMICIN 40 MG/ML
INJECTION, SOLUTION INTRAMUSCULAR; INTRAVENOUS
Status: DISCONTINUED
Start: 2024-07-05 | End: 2024-07-05 | Stop reason: HOSPADM

## 2024-07-05 RX ORDER — SCOLOPAMINE TRANSDERMAL SYSTEM 1 MG/1
1 PATCH, EXTENDED RELEASE TRANSDERMAL
Status: DISCONTINUED | OUTPATIENT
Start: 2024-07-05 | End: 2024-07-05 | Stop reason: HOSPADM

## 2024-07-05 RX ORDER — MIDAZOLAM HYDROCHLORIDE 1 MG/ML
INJECTION INTRAMUSCULAR; INTRAVENOUS PRN
Status: DISCONTINUED | OUTPATIENT
Start: 2024-07-05 | End: 2024-07-05 | Stop reason: SURG

## 2024-07-05 RX ORDER — METOCLOPRAMIDE HYDROCHLORIDE 5 MG/ML
INJECTION INTRAMUSCULAR; INTRAVENOUS PRN
Status: DISCONTINUED | OUTPATIENT
Start: 2024-07-05 | End: 2024-07-05 | Stop reason: SURG

## 2024-07-05 RX ORDER — OXYCODONE HCL 5 MG/5 ML
10 SOLUTION, ORAL ORAL
Status: DISCONTINUED | OUTPATIENT
Start: 2024-07-05 | End: 2024-07-05 | Stop reason: HOSPADM

## 2024-07-05 RX ORDER — DIPHENHYDRAMINE HYDROCHLORIDE 50 MG/ML
12.5 INJECTION INTRAMUSCULAR; INTRAVENOUS
Status: DISCONTINUED | OUTPATIENT
Start: 2024-07-05 | End: 2024-07-05 | Stop reason: HOSPADM

## 2024-07-05 RX ORDER — OXYCODONE HCL 5 MG/5 ML
5 SOLUTION, ORAL ORAL
Status: DISCONTINUED | OUTPATIENT
Start: 2024-07-05 | End: 2024-07-05 | Stop reason: HOSPADM

## 2024-07-05 RX ORDER — LIDOCAINE HYDROCHLORIDE 20 MG/ML
INJECTION, SOLUTION EPIDURAL; INFILTRATION; INTRACAUDAL; PERINEURAL PRN
Status: DISCONTINUED | OUTPATIENT
Start: 2024-07-05 | End: 2024-07-05 | Stop reason: SURG

## 2024-07-05 RX ORDER — ONDANSETRON 2 MG/ML
INJECTION INTRAMUSCULAR; INTRAVENOUS PRN
Status: DISCONTINUED | OUTPATIENT
Start: 2024-07-05 | End: 2024-07-05 | Stop reason: SURG

## 2024-07-05 RX ORDER — DEXAMETHASONE SODIUM PHOSPHATE 4 MG/ML
INJECTION, SOLUTION INTRA-ARTICULAR; INTRALESIONAL; INTRAMUSCULAR; INTRAVENOUS; SOFT TISSUE PRN
Status: DISCONTINUED | OUTPATIENT
Start: 2024-07-05 | End: 2024-07-05 | Stop reason: SURG

## 2024-07-05 RX ORDER — ACETAMINOPHEN 500 MG
1000 TABLET ORAL
Status: COMPLETED | OUTPATIENT
Start: 2024-07-05 | End: 2024-07-05

## 2024-07-05 RX ORDER — KETOROLAC TROMETHAMINE 15 MG/ML
INJECTION, SOLUTION INTRAMUSCULAR; INTRAVENOUS PRN
Status: DISCONTINUED | OUTPATIENT
Start: 2024-07-05 | End: 2024-07-05 | Stop reason: SURG

## 2024-07-05 RX ORDER — SODIUM CHLORIDE, SODIUM LACTATE, POTASSIUM CHLORIDE, CALCIUM CHLORIDE 600; 310; 30; 20 MG/100ML; MG/100ML; MG/100ML; MG/100ML
INJECTION, SOLUTION INTRAVENOUS CONTINUOUS
Status: DISCONTINUED | OUTPATIENT
Start: 2024-07-05 | End: 2024-07-05 | Stop reason: HOSPADM

## 2024-07-05 RX ORDER — SODIUM CHLORIDE, SODIUM LACTATE, POTASSIUM CHLORIDE, CALCIUM CHLORIDE 600; 310; 30; 20 MG/100ML; MG/100ML; MG/100ML; MG/100ML
INJECTION, SOLUTION INTRAVENOUS CONTINUOUS
Status: ACTIVE | OUTPATIENT
Start: 2024-07-05 | End: 2024-07-05

## 2024-07-05 RX ORDER — MEPERIDINE HYDROCHLORIDE 25 MG/ML
6.25 INJECTION INTRAMUSCULAR; INTRAVENOUS; SUBCUTANEOUS
Status: DISCONTINUED | OUTPATIENT
Start: 2024-07-05 | End: 2024-07-05 | Stop reason: HOSPADM

## 2024-07-05 RX ORDER — LIDOCAINE HYDROCHLORIDE 10 MG/ML
INJECTION, SOLUTION EPIDURAL; INFILTRATION; INTRACAUDAL; PERINEURAL
Status: DISCONTINUED
Start: 2024-07-05 | End: 2024-07-05 | Stop reason: HOSPADM

## 2024-07-05 RX ADMIN — CEFAZOLIN 2 G: 1 INJECTION, POWDER, FOR SOLUTION INTRAMUSCULAR; INTRAVENOUS at 07:45

## 2024-07-05 RX ADMIN — METOCLOPRAMIDE 10 MG: 5 INJECTION, SOLUTION INTRAMUSCULAR; INTRAVENOUS at 07:46

## 2024-07-05 RX ADMIN — KETOROLAC TROMETHAMINE 15 MG: 15 INJECTION, SOLUTION INTRAMUSCULAR; INTRAVENOUS at 07:46

## 2024-07-05 RX ADMIN — FENTANYL CITRATE 50 MCG: 50 INJECTION, SOLUTION INTRAMUSCULAR; INTRAVENOUS at 07:39

## 2024-07-05 RX ADMIN — ACETAMINOPHEN 1000 MG: 500 TABLET ORAL at 06:44

## 2024-07-05 RX ADMIN — PROPOFOL 50 MG: 10 INJECTION, EMULSION INTRAVENOUS at 07:46

## 2024-07-05 RX ADMIN — MIDAZOLAM HYDROCHLORIDE 2 MG: 2 INJECTION, SOLUTION INTRAMUSCULAR; INTRAVENOUS at 07:36

## 2024-07-05 RX ADMIN — LIDOCAINE HYDROCHLORIDE 100 MG: 20 INJECTION, SOLUTION EPIDURAL; INFILTRATION; INTRACAUDAL at 07:39

## 2024-07-05 RX ADMIN — DEXAMETHASONE SODIUM PHOSPHATE 8 MG: 4 INJECTION INTRA-ARTICULAR; INTRALESIONAL; INTRAMUSCULAR; INTRAVENOUS; SOFT TISSUE at 07:46

## 2024-07-05 RX ADMIN — ONDANSETRON 4 MG: 2 INJECTION INTRAMUSCULAR; INTRAVENOUS at 08:13

## 2024-07-05 RX ADMIN — SCOPOLAMINE 1 PATCH: 1.5 PATCH, EXTENDED RELEASE TRANSDERMAL at 06:44

## 2024-07-05 RX ADMIN — FENTANYL CITRATE 50 MCG: 50 INJECTION, SOLUTION INTRAMUSCULAR; INTRAVENOUS at 07:46

## 2024-07-05 RX ADMIN — SODIUM CHLORIDE, POTASSIUM CHLORIDE, SODIUM LACTATE AND CALCIUM CHLORIDE: 600; 310; 30; 20 INJECTION, SOLUTION INTRAVENOUS at 06:44

## 2024-07-05 RX ADMIN — PROPOFOL 150 MG: 10 INJECTION, EMULSION INTRAVENOUS at 07:39

## 2024-07-05 RX ADMIN — PHENAZOPYRIDINE 200 MG: 200 TABLET ORAL at 06:44

## 2024-07-05 ASSESSMENT — PAIN DESCRIPTION - PAIN TYPE
TYPE: SURGICAL PAIN

## 2024-07-05 ASSESSMENT — PAIN SCALES - GENERAL: PAIN_LEVEL: 0

## 2024-07-08 ENCOUNTER — TELEPHONE (OUTPATIENT)
Dept: OBGYN | Facility: CLINIC | Age: 54
End: 2024-07-08
Payer: COMMERCIAL

## 2024-07-09 ENCOUNTER — HOSPITAL ENCOUNTER (OUTPATIENT)
Dept: LAB | Facility: MEDICAL CENTER | Age: 54
End: 2024-07-09
Attending: INTERNAL MEDICINE
Payer: COMMERCIAL

## 2024-07-09 DIAGNOSIS — Z00.6 RESEARCH STUDY PATIENT: ICD-10-CM

## 2024-07-12 LAB
ELF SCORE: 8.6 - (ref 9.8–11.3)
HA (HYALURONIC ACID): 56.45 NG/ML
PIIINP (AMINO-TERMINAL PROPEPTIDE): 2.86 NG/ML
RELATIVE RISK: NORMAL
RISK GROUP: NORMAL
RISK: 3.3 %
TIMP-1 (TISSUE INHIBITOR OF MMP1): 206 NG/ML

## 2024-07-23 ENCOUNTER — PATIENT MESSAGE (OUTPATIENT)
Dept: GYNECOLOGY | Facility: CLINIC | Age: 54
End: 2024-07-23
Payer: COMMERCIAL

## 2024-07-23 RX ORDER — NITROFURANTOIN 25; 75 MG/1; MG/1
100 CAPSULE ORAL 2 TIMES DAILY
Qty: 10 CAPSULE | Refills: 0 | Status: SHIPPED | OUTPATIENT
Start: 2024-07-23 | End: 2024-07-28

## 2024-07-26 ENCOUNTER — PATIENT MESSAGE (OUTPATIENT)
Dept: GYNECOLOGY | Facility: CLINIC | Age: 54
End: 2024-07-26
Payer: COMMERCIAL

## 2024-07-28 LAB
APOB+LDLR+PCSK9 GENE MUT ANL BLD/T: NOT DETECTED
BRCA1+BRCA2 DEL+DUP + FULL MUT ANL BLD/T: NOT DETECTED
MLH1+MSH2+MSH6+PMS2 GN DEL+DUP+FUL M: NOT DETECTED

## 2024-08-07 NOTE — PROGRESS NOTES
Urogynecology & Reconstructive Pelvic Surgery - Follow Up    Dasia Covarrubias MRN:[id number] :1970    Referred by: Trinity Mayo DO    Reason for Visit: Post op      Subjective     History of Presenting Illness:    Dasia Covarrubias is a 54 y.o. year old P3 with urinary incontinence presents for follow-up 6 weeks s/p retropubic sling.    She has noticed improvement in PEGGY, but still has urgency/frequency and leaks (lower volume. Pressure after surgery has improved.     Previously underwent ultrasound for pelvic pain which showed small ovarian cysts.  She will follow-up in a few months with repeat ultrasound      Initial HPI: She presents for the evaluation and management of recurrent urinary incontinence.     She is significantly bothered by urinary incontinence, multiple times per day requiring pads.  This is usually with activities including exercise, cough, sneeze, laugh, movement.  She also has some urgency and frequency, going up to 10 times per day, and some urge leakages.  When asked, her stress type urinary symptoms are more bothersome than urge.    He has history of a prior mid urethral sling in , unsure if this is transobturator or retropubic type, and this helped her stress incontinence for a few years but slowly worsened.  Her symptoms are now worse than before her last sling.    She had been started on vaginal estrogen tablets by her gynecologist, and has used these for 1 year.  She had some concerns about breast cancer, which were reassured today    Prior Pelvic surgery:    bilateral tubal ligation ([facility])  2010 sling, unsure which type, op report not available  2024: Retropubic sling ([facility])     Prior treatment:   vaginal estrogen tablet - 2x weekly    Pelvic floor symptom review:     Bladder:   Voids per day: 10+ Voids per night: 1      Urinary incontinence episodes per day: 5-6    Urge leakage:  On Movement to Bathroom and Full Bladder --> persistent after  sling   Stress leakage: With Cough, With Laugh, With Exercise, With [place], With Bending/Squatting, and Upon Standing --> significant prevent/resolved   Stress> urge   Continuous / insensible urine loss: No    Nocturnal enuresis: No    Leakage volume: Moderate   Number of pads/day: 3-4    UTI in last 12 months: yes   Other urologic history: h/o stones      Prolapse:     Prolapse symptoms: None     Bowel:    Constipation: some   Bowel movements per day: 4/week    Straining to empty bowels: No    Splinting to evacuate: No    Painful evacuation: No    Difficulty emptying rectum: No    Incontinence to stool: 1-2 month, passive, staining, looser stool   Blood in stool: No    Hemorrhoids: No        Sexual function:    Sexually active: No    Gender of partners: Male   Pain with intercourse: No      Past medical and surgical history    Past obstetric history   Number of vaginal deliveries: 3   Number of  deliveries: 0   History of vacuum/forceps: No    History of obstetric anal sphincter injury: No     Past gynecological history:    Last menstrual period: No LMP recorded. Patient is postmenopausal.   History of abnormal uterine bleeding: No    History of fibroids: No    History of endometrial polyps:  No    History of endometriosis: No    History of cervical dysplasia: No      Past medical history:  Past Medical History:   Diagnosis Date    Unspecified vitamin D deficiency 2011    Allergic asthma 2011    Insomnia 2011    PMDD (premenstrual dysphoric disorder) 2011    Pure hypercholesterolemia 2011    Calcium nephrolithiasis 2011    Kidney stones, mixed calcium oxalate 2008    has had 2 different time, always requiring surgery to remove    Anesthesia     motion sickness    Heart burn     High cholesterol     PONV (postoperative nausea and vomiting)      Past surgical history:  Past Surgical History:   Procedure Laterality Date    BLADDER SLING FEMALE N/A 2024     Procedure: RETROPUBIC MID URETHRAL SLING INSERTION AND ALL INDICATED PROCEDURES;  Surgeon: Anabella Armas M.D.;  Location: SURGERY SAME DAY [facility];  Service: Gynecology    PB KNEE SCOPE,REMV LOOSE BODY Right 05/10/2024    Procedure: RIGHT KNEE ARTHROSCOPY, RIGHT LOOSE BODY REMOVAL, SYNOVECTOMY;  Surgeon: Artie Cummins M.D.;  Location: [facility];  Service: Orthopedics    PB KNEE SCOPE,DIAGNOSTIC Right 05/03/2023    Procedure: RIGHT KNEE ARTHROSCOPY,  SYNOVECTOMY;  Surgeon: Artie Cummins M.D.;  Location: SURGERY [facility];  Service: Orthopedics    SYNOVECTOMY Right 05/03/2023    Procedure: SYNOVECTOMY;  Surgeon: Artie Cummins M.D.;  Location: SURGERY [facility];  Service: Orthopedics    KNEE ARTHROSCOPY Left 2020    KNEE ARTHROSCOPY Right 12/11/2019    Procedure: RT KNEE ARTHROSCOPY LOOSE BODY REMOVAL, EXTENSIVE SYNOVECTOMY, CHONDROPLASTY;  Surgeon: Eliceo Palmer M.D.;  Location: SURGERY [facility];  Service: Orthopedics    FOOT SURGERY Bilateral     Remove loos body 2022    OTHER  2021    My feet and knees cleared, tubal, breast ogmentation    STONE RETRIEVAL      for kidney stone x2 has had both left and right    TUBAL LIGATION       Medications:has a current medication list which includes the following prescription(s): trospium chloride, albuterol, promethazine-dextromethorphan, buspirone, ropinirole, cetirizine, and atorvastatin.  Allergies:Penicillins and Sulfa drugs  Family history:  Family History   Problem Relation Age of Onset    Cancer Mother         leukemia and carcinoid    Heart Attack Father 52        multiple heart attacks    Heart Disease Father     Hyperlipidemia Father     Hypertension Father     Cancer Sister 35        breast cancer    Cancer Maternal Grandfather         prostate cancer    Cancer Paternal Grandmother         colon cancer     Social history: reports that she has never smoked. She has never used smokeless tobacco. She reports current  alcohol use of about 0.6 oz of alcohol per week. She reports that she does not use drugs.    Review of systems: A full review of systems was performed, and negative with the exception of want is noted above in the HPI.        Objective        /83 (BP Location: Left arm, Patient Position: Supine, BP Cuff Size: Adult)   Pulse 67   Wt 135 lb   BMI 23.17 kg/m²     Physical Exam  Vitals reviewed. Exam conducted with a chaperone present.   Constitutional:       Appearance: Normal appearance.   HENT:      Head: Normocephalic.      Mouth/Throat:      Mouth: Mucous membranes are moist.   Cardiovascular:      Rate and Rhythm: Normal rate.   Pulmonary:      Effort: Pulmonary effort is normal.   Skin:     General: Skin is warm and dry.   Neurological:      Mental Status: She is alert.   Psychiatric:         Mood and Affect: Mood normal.     Urethral incision well-healed, some sutures still present.  No mesh exposures or tenderness    Procedure Performed: none        Diagnostic test and records review:      Post-void residual: 0 mL post op    Labs: n/a    Radiology: n/a    Procedural:     Cystourethroscopy: Normal urothelium, no mesh exposure is noted    Urodynamics:  Filling phase: Normal capacity and compliance.  Stress incontinence demonstrated at half and full bladder capacity with lower leak point pressure towards capacity.  Some uninhibited detrusor contraction without leak  Voiding phase: Complete emptying via detrusor contraction    Documentation reviewed: Prior EMR Records         Assessment & Plan     Dasia Reyes Marci is a 54 y.o. year old P3 with stress predominant mixed urinary incontinence, now s/p sling    Urinary incontinence, mixed, stress predominant  History of suburethral sling procedure  -Urodynamics showed stress predominant leakage, also with uninhibited detrusor contractions.  -Cystourethroscopy without any mesh exposures or other urothelial abnormalities  -Improvement in stress  incontinence with retropubic sling, but persistent urgency.  She was given options of observation, pelvic floor physical therapy, or trial of overactive bladder medications.  -Prescription sent for trospium  -Referral already in place for physical therapy, and she was encouraged to make an appointment  -She was briefly counseled on all third line treatments for overactive bladder/urge incontinence including Botox, PTNS, sacral neuromodulation.      Pelvic and back pain  Bilateral ovarian cysts  Family h/o ovarian cancer  Her cyst or simple and the largest is 3 cm.  I do not think this is necessarily the explanation for her pelvic and back pain, and recommend physical therapy for this.  Cysts can be normal for her age, but given her family history, I recommend surveillance imaging.  If she has persistent or enlarging ovarian cysts it may be worthwhile considering workup with tumor markers and potential oophorectomy.  -Pelvic ultrasound ordered for 3 months (9/2024)    Fecal staining  Continue to recommend pelvic floor PT - referral re-sent    Genitourinary syndrome of menopause  She has vaginal atrophy on examination. Reviewed risks, benefits, and indications for vaginal estrogen therapy.  Continue with vaginal estrogen therapy twice weekly.                  Anabella Armas MD, FACOG  Urogynecology and Reconstructive Pelvic Surgery  Department of Obstetrics and Gynecology  [facility]        This medical record contains text that has been entered with the assistance of computer voice recognition and dictation software.  Therefore, it may contain unintended errors in text, spelling, punctuation, or grammar

## 2024-08-08 ENCOUNTER — APPOINTMENT (OUTPATIENT)
Dept: GYNECOLOGY | Facility: CLINIC | Age: 54
End: 2024-08-08
Payer: COMMERCIAL

## 2024-08-08 VITALS
WEIGHT: 135 LBS | HEART RATE: 67 BPM | SYSTOLIC BLOOD PRESSURE: 124 MMHG | BODY MASS INDEX: 23.17 KG/M2 | DIASTOLIC BLOOD PRESSURE: 83 MMHG

## 2024-08-08 DIAGNOSIS — Z98.890 POSTOPERATIVE STATE: Primary | ICD-10-CM

## 2024-08-08 PROBLEM — N39.3 SUI (STRESS URINARY INCONTINENCE, FEMALE): Status: RESOLVED | Noted: 2024-07-05 | Resolved: 2024-08-08

## 2024-08-08 LAB
APPEARANCE UR: CLEAR
BILIRUB UR STRIP-MCNC: NEGATIVE MG/DL
COLOR UR AUTO: YELLOW
GLUCOSE UR STRIP.AUTO-MCNC: NEGATIVE MG/DL
KETONES UR STRIP.AUTO-MCNC: NEGATIVE MG/DL
LEUKOCYTE ESTERASE UR QL STRIP.AUTO: NEGATIVE
NITRITE UR QL STRIP.AUTO: NEGATIVE
PH UR STRIP.AUTO: 5.5 [PH] (ref 5–8)
PROT UR QL STRIP: NEGATIVE MG/DL
RBC UR QL AUTO: NEGATIVE
SP GR UR STRIP.AUTO: >=1.03
UROBILINOGEN UR STRIP-MCNC: NORMAL MG/DL

## 2024-08-08 PROCEDURE — 3074F SYST BP LT 130 MM HG: CPT | Performed by: STUDENT IN AN ORGANIZED HEALTH CARE EDUCATION/TRAINING PROGRAM

## 2024-08-08 PROCEDURE — 81002 URINALYSIS NONAUTO W/O SCOPE: CPT | Performed by: STUDENT IN AN ORGANIZED HEALTH CARE EDUCATION/TRAINING PROGRAM

## 2024-08-08 PROCEDURE — 3079F DIAST BP 80-89 MM HG: CPT | Performed by: STUDENT IN AN ORGANIZED HEALTH CARE EDUCATION/TRAINING PROGRAM

## 2024-08-08 PROCEDURE — 99024 POSTOP FOLLOW-UP VISIT: CPT | Performed by: STUDENT IN AN ORGANIZED HEALTH CARE EDUCATION/TRAINING PROGRAM

## 2024-08-08 RX ORDER — TROSPIUM CHLORIDE ER 60 MG/1
60 CAPSULE ORAL DAILY
Qty: 30 CAPSULE | Refills: 0 | Status: SHIPPED | OUTPATIENT
Start: 2024-08-08 | End: 2024-09-07

## 2024-08-08 NOTE — PROGRESS NOTES
Patient here for Post Op Exam  Surgery Date: 7/5/2024   PVR : 0mL  Good #: [phone]   Pharmacy Verified

## 2024-08-09 ENCOUNTER — APPOINTMENT (OUTPATIENT)
Dept: GYNECOLOGY | Facility: CLINIC | Age: 54
End: 2024-08-09
Payer: COMMERCIAL

## 2024-08-15 ENCOUNTER — APPOINTMENT (OUTPATIENT)
Dept: GYNECOLOGY | Facility: CLINIC | Age: 54
End: 2024-08-15
Payer: COMMERCIAL

## 2024-08-24 RX ORDER — BUTALBITAL, ACETAMINOPHEN AND CAFFEINE 50; 325; 40 MG/1; MG/1; MG/1
TABLET ORAL
Qty: 18 TABLET | Refills: 0 | OUTPATIENT
Start: 2024-08-24

## 2024-09-13 ENCOUNTER — HOSPITAL ENCOUNTER (OUTPATIENT)
Dept: RADIOLOGY | Facility: MEDICAL CENTER | Age: 54
End: 2024-09-13
Attending: STUDENT IN AN ORGANIZED HEALTH CARE EDUCATION/TRAINING PROGRAM
Payer: COMMERCIAL

## 2024-09-13 DIAGNOSIS — N83.201 BILATERAL OVARIAN CYSTS: ICD-10-CM

## 2024-09-13 DIAGNOSIS — Z80.41 FAMILY HISTORY OF OVARIAN CANCER: ICD-10-CM

## 2024-09-13 DIAGNOSIS — N83.202 BILATERAL OVARIAN CYSTS: ICD-10-CM

## 2024-09-13 PROCEDURE — 76830 TRANSVAGINAL US NON-OB: CPT

## 2024-09-16 RX ORDER — TROSPIUM CHLORIDE ER 60 MG/1
CAPSULE ORAL
Qty: 30 CAPSULE | Refills: 0 | Status: SHIPPED | OUTPATIENT
Start: 2024-09-16

## 2024-09-16 RX ORDER — GABAPENTIN 100 MG/1
CAPSULE ORAL
Qty: 30 CAPSULE | Refills: 0 | OUTPATIENT
Start: 2024-09-16

## 2024-09-16 NOTE — TELEPHONE ENCOUNTER
Spoke with pt after confirmed by 2 identifiers.     No documentation of prior gabapentin rx given in notes or under medications review. Pt reports was prescribed by Dr. Armas for nerve pain following MUS 7/5/2024. Pain underneath suprapubic sling trocar site, non-radiating and improving with tylenol/ibu. Recommend continuing with tylenol/ibu given improvement. Pt instructed to call if pain persistent or worsens.     Refill for trospium sent.     Pt acknowledged understanding and agreed with the plan. All questions answered.    Chelsie Love MD  Female Pelvic Medicine and Reconstructive Surgery  Department of Obstetrics and Gynecology  [employer]  [facility]

## 2024-09-26 RX ORDER — MONTELUKAST SODIUM 10 MG/1
TABLET ORAL
Qty: 90 TABLET | Refills: 0 | OUTPATIENT
Start: 2024-09-26

## 2024-10-14 RX ORDER — TROSPIUM CHLORIDE ER 60 MG/1
1 CAPSULE ORAL DAILY
Qty: 30 CAPSULE | Refills: 2 | Status: SHIPPED | OUTPATIENT
Start: 2024-10-14

## 2024-10-29 ENCOUNTER — HOSPITAL ENCOUNTER (OUTPATIENT)
Dept: RADIOLOGY | Facility: MEDICAL CENTER | Age: 54
End: 2024-10-29
Attending: STUDENT IN AN ORGANIZED HEALTH CARE EDUCATION/TRAINING PROGRAM
Payer: COMMERCIAL

## 2024-10-29 DIAGNOSIS — Z12.31 VISIT FOR SCREENING MAMMOGRAM: ICD-10-CM

## 2024-10-29 PROCEDURE — 77067 SCR MAMMO BI INCL CAD: CPT

## 2024-11-11 ENCOUNTER — HOSPITAL ENCOUNTER (OUTPATIENT)
Dept: RADIOLOGY | Facility: MEDICAL CENTER | Age: 54
End: 2024-11-11
Attending: STUDENT IN AN ORGANIZED HEALTH CARE EDUCATION/TRAINING PROGRAM
Payer: COMMERCIAL

## 2024-11-11 DIAGNOSIS — N83.209 CYSTIC DISEASE OF OVARIES: ICD-10-CM

## 2024-11-11 PROCEDURE — 700117 HCHG RX CONTRAST REV CODE 255

## 2024-11-11 PROCEDURE — 74176 CT ABD & PELVIS W/O CONTRAST: CPT

## 2024-11-11 RX ADMIN — IOHEXOL 25 ML: 240 INJECTION, SOLUTION INTRATHECAL; INTRAVASCULAR; INTRAVENOUS; ORAL at 11:40

## 2024-11-20 ENCOUNTER — HOSPITAL ENCOUNTER (OUTPATIENT)
Facility: MEDICAL CENTER | Age: 54
End: 2024-11-20
Attending: NURSE PRACTITIONER
Payer: COMMERCIAL

## 2024-11-20 ENCOUNTER — OFFICE VISIT (OUTPATIENT)
Dept: URGENT CARE | Facility: PHYSICIAN GROUP | Age: 54
End: 2024-11-20
Payer: COMMERCIAL

## 2024-11-20 VITALS
TEMPERATURE: 98.6 F | HEIGHT: 64 IN | BODY MASS INDEX: 23.66 KG/M2 | HEART RATE: 93 BPM | RESPIRATION RATE: 16 BRPM | OXYGEN SATURATION: 98 % | DIASTOLIC BLOOD PRESSURE: 80 MMHG | SYSTOLIC BLOOD PRESSURE: 116 MMHG | WEIGHT: 138.6 LBS

## 2024-11-20 DIAGNOSIS — R10.9 FLANK PAIN: ICD-10-CM

## 2024-11-20 DIAGNOSIS — R10.2 SUPRAPUBIC PAIN: ICD-10-CM

## 2024-11-20 LAB
APPEARANCE UR: CLEAR
BILIRUB UR STRIP-MCNC: NEGATIVE MG/DL
CANDIDA DNA VAG QL PROBE+SIG AMP: NEGATIVE
COLOR UR AUTO: YELLOW
G VAGINALIS DNA VAG QL PROBE+SIG AMP: NEGATIVE
GLUCOSE UR STRIP.AUTO-MCNC: NEGATIVE MG/DL
KETONES UR STRIP.AUTO-MCNC: NEGATIVE MG/DL
LEUKOCYTE ESTERASE UR QL STRIP.AUTO: NEGATIVE
NITRITE UR QL STRIP.AUTO: NEGATIVE
PH UR STRIP.AUTO: 5.5 [PH] (ref 5–8)
PROT UR QL STRIP: NEGATIVE MG/DL
RBC UR QL AUTO: NEGATIVE
SP GR UR STRIP.AUTO: 1.01
T VAGINALIS DNA VAG QL PROBE+SIG AMP: NEGATIVE
UROBILINOGEN UR STRIP-MCNC: 0.2 MG/DL

## 2024-11-20 PROCEDURE — 3074F SYST BP LT 130 MM HG: CPT | Performed by: NURSE PRACTITIONER

## 2024-11-20 PROCEDURE — 99213 OFFICE O/P EST LOW 20 MIN: CPT | Performed by: NURSE PRACTITIONER

## 2024-11-20 PROCEDURE — 87480 CANDIDA DNA DIR PROBE: CPT

## 2024-11-20 PROCEDURE — 87660 TRICHOMONAS VAGIN DIR PROBE: CPT

## 2024-11-20 PROCEDURE — 3079F DIAST BP 80-89 MM HG: CPT | Performed by: NURSE PRACTITIONER

## 2024-11-20 PROCEDURE — 81002 URINALYSIS NONAUTO W/O SCOPE: CPT | Performed by: NURSE PRACTITIONER

## 2024-11-20 PROCEDURE — 87510 GARDNER VAG DNA DIR PROBE: CPT

## 2024-11-20 RX ORDER — METHOCARBAMOL 500 MG/1
TABLET, FILM COATED ORAL
COMMUNITY
Start: 2024-09-09

## 2024-11-20 RX ORDER — FLUOXETINE 40 MG/1
CAPSULE ORAL
COMMUNITY
Start: 2024-10-18

## 2024-11-20 RX ORDER — BECLOMETHASONE DIPROPIONATE HFA 40 UG/1
AEROSOL, METERED RESPIRATORY (INHALATION)
COMMUNITY
Start: 2024-11-07

## 2024-11-20 RX ORDER — ATOGEPANT 60 MG/1
TABLET ORAL
COMMUNITY
Start: 2024-09-15

## 2024-11-20 RX ORDER — TOPIRAMATE 25 MG/1
TABLET, FILM COATED ORAL
COMMUNITY
Start: 2024-10-18

## 2024-11-20 RX ORDER — CLONAZEPAM 0.5 MG/1
TABLET ORAL
COMMUNITY
Start: 2024-08-29

## 2024-11-20 NOTE — PROGRESS NOTES
"Subjective:   Dasia Covarrubias is a 54 y.o. female who presents for UTI (Suprapubic pain, lower back pain, urine cloudy )    This is an acute condition.  Patient states she woke up this morning with cloudy urine, mild lower back pain, and suprapubic cramping.  Patient does have history of UTIs and kidney stones in the past.  She denies any frequency with urination, urgency with urination, fevers, chills, flank pain, hematuria, or fevers.  Patient does have IUD in place.  She has had some vaginal discharge however she states this overall normal during the time that she would have a period.      Medications, Allergies, and current problem list reviewed today in Epic.     Objective:     /80   Pulse 93   Temp 37 °C (98.6 °F) (Temporal)   Resp 16   Ht 1.626 m (5' 4\")   Wt 62.9 kg (138 lb 9.6 oz)   SpO2 98%     Physical Exam  Vitals reviewed.   Constitutional:       General: She is not in acute distress.     Appearance: Normal appearance. She is not ill-appearing, toxic-appearing or diaphoretic.   HENT:      Head: Normocephalic.      Nose: Nose normal.      Mouth/Throat:      Mouth: Mucous membranes are moist.   Eyes:      Extraocular Movements: Extraocular movements intact.      Conjunctiva/sclera: Conjunctivae normal.      Pupils: Pupils are equal, round, and reactive to light.   Cardiovascular:      Rate and Rhythm: Normal rate.   Pulmonary:      Effort: Pulmonary effort is normal.   Abdominal:      General: Abdomen is flat. Bowel sounds are normal. There is no distension.      Tenderness: There is abdominal tenderness in the suprapubic area. There is no right CVA tenderness, left CVA tenderness, guarding or rebound.      Comments: Mild \"discomfort\" to palpation in suprapubic area.    Musculoskeletal:         General: Normal range of motion.      Cervical back: Normal range of motion and neck supple.   Skin:     General: Skin is warm and dry.   Neurological:      Mental Status: She is alert and " oriented to person, place, and time.   Psychiatric:         Mood and Affect: Mood normal.         Behavior: Behavior normal.         Thought Content: Thought content normal.         Judgment: Judgment normal.       Results for orders placed or performed in visit on 11/20/24   POCT Urinalysis    Collection Time: 11/20/24 12:22 PM   Result Value Ref Range    POC Color yellow Negative    POC Appearance clear Negative    POC Glucose negative Negative mg/dL    POC Bilirubin negative Negative mg/dL    POC Ketones negative Negative mg/dL    POC Specific Gravity 1.010 <1.005 - >1.030    POC Blood negative Negative    POC Urine PH 5.5 5.0 - 8.0    POC Protein negative Negative mg/dL    POC Urobiligen 0.2 Negative (0.2) mg/dL    POC Nitrites negative Negative    POC Leukocyte Esterase negative Negative         Assessment/Plan:     Diagnosis and associated orders:     1. Flank pain        2. Suprapubic pain  POCT Urinalysis    VAGINAL PATHOGENS DNA PANEL         Comments/MDM:     This is an acute condition.  POCT urinalysis is unremarkable.  Did discuss with patient differential diagnoses.  Patient does not have any CVA tenderness or flank pain on physical exam.  She does report mild cramping/discomfort sensation to suprapubic area.  Will go ahead and run vaginal pathogen interest and results via [employer] treat accordingly.  Recommended patient push fluids.  Represent clinic if symptoms are not improving or acutely worsen.  Patient was involved with shared decision-making throughout the exam today and verbalizes understanding regards to plan of care, discharge instructions, and follow-up         Differential diagnosis, natural history, supportive care, and indications for immediate follow-up discussed.    Advised the patient to follow-up with the primary care physician for recheck, reevaluation, and consideration of further management.    I personally reviewed prior external notes and test results pertinent to today's visit as  well as additional imaging and testing completed in clinic today.     Please note that this dictation was created using voice recognition software. I have made a reasonable attempt to correct obvious errors, but I expect that there are errors of grammar and possibly content that I did not discover before finalizing the note.

## 2025-01-10 RX ORDER — TROSPIUM CHLORIDE ER 60 MG/1
1 CAPSULE ORAL DAILY
Qty: 30 CAPSULE | Refills: 6 | Status: SHIPPED | OUTPATIENT
Start: 2025-01-10

## 2025-04-12 ENCOUNTER — HOSPITAL ENCOUNTER (OUTPATIENT)
Facility: MEDICAL CENTER | Age: 55
End: 2025-04-12
Payer: COMMERCIAL

## 2025-04-12 ENCOUNTER — OFFICE VISIT (OUTPATIENT)
Dept: URGENT CARE | Facility: PHYSICIAN GROUP | Age: 55
End: 2025-04-12
Payer: COMMERCIAL

## 2025-04-12 VITALS
BODY MASS INDEX: 23.22 KG/M2 | DIASTOLIC BLOOD PRESSURE: 80 MMHG | HEART RATE: 94 BPM | WEIGHT: 136 LBS | SYSTOLIC BLOOD PRESSURE: 116 MMHG | OXYGEN SATURATION: 98 % | HEIGHT: 64 IN | TEMPERATURE: 97.6 F | RESPIRATION RATE: 14 BRPM

## 2025-04-12 DIAGNOSIS — R30.0 DYSURIA: ICD-10-CM

## 2025-04-12 DIAGNOSIS — G44.209 TENSION HEADACHE: ICD-10-CM

## 2025-04-12 LAB
APPEARANCE UR: CLEAR
BILIRUB UR STRIP-MCNC: NEGATIVE MG/DL
COLOR UR AUTO: YELLOW
GLUCOSE UR STRIP.AUTO-MCNC: NEGATIVE MG/DL
KETONES UR STRIP.AUTO-MCNC: NEGATIVE MG/DL
LEUKOCYTE ESTERASE UR QL STRIP.AUTO: NEGATIVE
NITRITE UR QL STRIP.AUTO: NEGATIVE
PH UR STRIP.AUTO: 7 [PH] (ref 5–8)
PROT UR QL STRIP: NORMAL MG/DL
RBC UR QL AUTO: NEGATIVE
SP GR UR STRIP.AUTO: 1.02
UROBILINOGEN UR STRIP-MCNC: 0.2 MG/DL

## 2025-04-12 PROCEDURE — 81002 URINALYSIS NONAUTO W/O SCOPE: CPT

## 2025-04-12 PROCEDURE — 3074F SYST BP LT 130 MM HG: CPT

## 2025-04-12 PROCEDURE — 87086 URINE CULTURE/COLONY COUNT: CPT

## 2025-04-12 PROCEDURE — 3079F DIAST BP 80-89 MM HG: CPT

## 2025-04-12 PROCEDURE — 99214 OFFICE O/P EST MOD 30 MIN: CPT

## 2025-04-12 RX ORDER — ONDANSETRON 4 MG/1
4 TABLET, ORALLY DISINTEGRATING ORAL EVERY 6 HOURS PRN
Qty: 15 TABLET | Refills: 0 | Status: SHIPPED | OUTPATIENT
Start: 2025-04-12

## 2025-04-12 RX ORDER — KETOROLAC TROMETHAMINE 15 MG/ML
15 INJECTION, SOLUTION INTRAMUSCULAR; INTRAVENOUS ONCE
Status: COMPLETED | OUTPATIENT
Start: 2025-04-12 | End: 2025-04-12

## 2025-04-12 RX ADMIN — KETOROLAC TROMETHAMINE 15 MG: 15 INJECTION, SOLUTION INTRAMUSCULAR; INTRAVENOUS at 13:35

## 2025-04-13 DIAGNOSIS — R30.0 DYSURIA: ICD-10-CM

## 2025-04-14 ASSESSMENT — ENCOUNTER SYMPTOMS
NAUSEA: 1
BACK PAIN: 1
DIZZINESS: 1
VOMITING: 0
MYALGIAS: 0
SHORTNESS OF BREATH: 0
FATIGUE: 1
CHILLS: 0
ABDOMINAL PAIN: 0
VERTIGO: 1
SORE THROAT: 0
HEADACHES: 1
COUGH: 0
DIARRHEA: 0
FEVER: 0

## 2025-04-14 NOTE — PROGRESS NOTES
Subjective:   Dasia Covarrubias is a 54 y.o. female who presents for Migraine (Nausea, dizzy, splitting headache /X 8 days )      Other  This is a new problem. The current episode started 1 to 4 weeks ago. The problem has been gradually worsening. Associated symptoms include fatigue, headaches, nausea, urinary symptoms and vertigo. Pertinent negatives include no abdominal pain, chest pain, chills, congestion, coughing, fever, myalgias, rash, sore throat or vomiting. Treatments tried: Prescription migraine medication.       Review of Systems   Constitutional:  Positive for fatigue. Negative for chills, fever and malaise/fatigue.   HENT:  Negative for congestion, ear pain, hearing loss and sore throat.    Respiratory:  Negative for cough and shortness of breath.    Cardiovascular:  Negative for chest pain.   Gastrointestinal:  Positive for nausea. Negative for abdominal pain, diarrhea and vomiting.   Genitourinary:  Negative for dysuria.   Musculoskeletal:  Positive for back pain (Low back). Negative for myalgias.   Skin:  Negative for rash.   Neurological:  Positive for dizziness, vertigo and headaches.       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/06/2023    Other specified disorders of synovium and tendon, other site 12/11/2019    Gastric ulcer 09/24/2019    Migraine without aura 09/24/2019    Generalized anxiety disorder 11/07/2017    Allergic rhinitis 09/03/2017    Vitamin D deficiency 06/02/2011    Allergic asthma 05/05/2011    Insomnia 05/05/2011    PMDD (premenstrual dysphoric disorder) 05/05/2011    Pure hypercholesterolemia 05/05/2011    Calcium  nephrolithiasis 05/05/2011       Current Outpatient Medications Ordered in Epic   Medication Sig Dispense Refill    ondansetron (ZOFRAN ODT) 4 MG TABLET DISPERSIBLE Take 1 Tablet by mouth every 6 hours as needed for Nausea/Vomiting for up to 15 doses. 15 Tablet 0    meloxicam (MOBIC) 15 MG tablet Take 1 Tablet by mouth 1 time a day as needed for Moderate Pain. 30 Tablet 0    Trospium Chloride 60 MG CAPSULE SR 24 HR Take 1 capsule by mouth once daily 30 Capsule 6    QULIPTA 60 MG Tab       QVAR REDIHALER 40 MCG/ACT inhaler       clonazePAM (KLONOPIN) 0.5 MG Tab       fluoxetine (PROZAC) 40 MG capsule       methocarbamol (ROBAXIN) 500 MG Tab       topiramate (TOPAMAX) 25 MG Tab       albuterol 108 (90 Base) MCG/ACT Aero Soln inhalation aerosol Inhale 2 Puffs every four hours as needed for Shortness of Breath. 1 Each 1    busPIRone (BUSPAR) 10 MG Tab tablet Take 10 mg by mouth 2 times a day. 2 tabs in am and 1 at night    CONTINUE TAKING MED PRIOR TO SURGERY      ROPINIRole (REQUIP) 0.5 MG Tab Take 0.5 mg by mouth every evening. For restless legs    CONTINUE TAKING MED PRIOR TO SURGERY      cetirizine (ZYRTEC) 10 MG Tab Take 10 mg by mouth every day.     CONTINUE TAKING MED PRIOR TO SURGERY      atorvastatin (LIPITOR) 40 MG Tab Take 40 mg by mouth every day.     CONTINUE TAKING MED PRIOR TO SURGERY       No current Epic-ordered facility-administered medications on file.       Past Surgical History:   Procedure Laterality Date    BLADDER SLING FEMALE N/A 7/5/2024    Procedure: RETROPUBIC MID URETHRAL SLING INSERTION AND ALL INDICATED PROCEDURES;  Surgeon: Anabella Armas M.D.;  Location: SURGERY SAME DAY [facility];  Service: Gynecology    PB KNEE SCOPE,REMV LOOSE BODY Right 05/10/2024    Procedure: RIGHT KNEE ARTHROSCOPY, RIGHT LOOSE BODY REMOVAL, SYNOVECTOMY;  Surgeon: Artie Cummins M.D.;  Location: [facility];  Service: Orthopedics    PB KNEE SCOPE,DIAGNOSTIC Right 05/03/2023    Procedure: RIGHT  "KNEE ARTHROSCOPY,  SYNOVECTOMY;  Surgeon: Artie Cummins M.D.;  Location: SURGERY [facility];  Service: Orthopedics    SYNOVECTOMY Right 05/03/2023    Procedure: SYNOVECTOMY;  Surgeon: Artie Cummins M.D.;  Location: SURGERY [facility];  Service: Orthopedics    KNEE ARTHROSCOPY Left 2020    KNEE ARTHROSCOPY Right 12/11/2019    Procedure: RT KNEE ARTHROSCOPY LOOSE BODY REMOVAL, EXTENSIVE SYNOVECTOMY, CHONDROPLASTY;  Surgeon: Eliceo Palmer M.D.;  Location: SURGERY [facility];  Service: Orthopedics    FOOT SURGERY Bilateral     Remove loos body 2022    OTHER  2021    My feet and knees cleared, tubal, breast ogmentation    STONE RETRIEVAL      for kidney stone x2 has had both left and right    TUBAL LIGATION         Social History     Tobacco Use    Smoking status: Never    Smokeless tobacco: Never   Vaping Use    Vaping status: Never Used   Substance Use Topics    Alcohol use: Yes     Alcohol/week: 0.6 oz     Types: 1 Standard drinks or equivalent per week     Comment: rare, 1-2 every 3 mos    Drug use: No       family history includes Cancer in her maternal grandfather, mother, and paternal grandmother; Cancer (age of onset: 35) in her sister; Heart Attack (age of onset: 52) in her father; Heart Disease in her father; Hyperlipidemia in her father; Hypertension in her father.     Problem list, medications, and allergies reviewed by myself today in Epic.     Objective:   /80   Pulse 94   Temp 36.4 °C (97.6 °F) (Temporal)   Resp 14   Ht 1.626 m (5' 4\")   Wt 61.7 kg (136 lb)   SpO2 98%   BMI 23.34 kg/m²     Physical Exam  Vitals and nursing note reviewed.   Constitutional:       General: She is not in acute distress.     Appearance: Normal appearance. She is ill-appearing.   HENT:      Head: Normocephalic and atraumatic.      Right Ear: Tympanic membrane normal.      Left Ear: Tympanic membrane normal.      Nose: Nose normal.      Mouth/Throat:      Mouth: Mucous membranes are moist. "   Eyes:      Conjunctiva/sclera: Conjunctivae normal.   Cardiovascular:      Rate and Rhythm: Normal rate and regular rhythm.      Heart sounds: Normal heart sounds. No murmur heard.  Pulmonary:      Effort: Pulmonary effort is normal. No respiratory distress.      Breath sounds: No stridor. No wheezing or rhonchi.   Abdominal:      General: Abdomen is flat.      Palpations: Abdomen is soft.      Tenderness: There is no abdominal tenderness. There is no right CVA tenderness, left CVA tenderness or guarding.   Musculoskeletal:         General: Normal range of motion.      Cervical back: Normal range of motion.   Skin:     General: Skin is warm and dry.      Capillary Refill: Capillary refill takes less than 2 seconds.   Neurological:      Mental Status: She is alert and oriented to person, place, and time.   Psychiatric:         Mood and Affect: Mood normal.         Behavior: Behavior normal.       Results for orders placed or performed in visit on 04/12/25   POCT Urinalysis    Collection Time: 04/12/25  2:02 PM   Result Value Ref Range    POC Color YELLOW Negative    POC Appearance CLEAR Negative    POC Glucose NEGATIVE Negative mg/dL    POC Bilirubin NEGATIVE Negative mg/dL    POC Ketones NEGATIVE Negative mg/dL    POC Specific Gravity 1.020 <1.005 - >1.030    POC Blood NEGATIVE Negative    POC Urine PH 7.0 5.0 - 8.0    POC Protein TRACE Negative mg/dL    POC Urobiligen 0.2 Negative (0.2) mg/dL    POC Nitrites NEGATIVE Negative    POC Leukocyte Esterase NEGATIVE Negative       Assessment/Plan:     1. Dysuria  POCT Urinalysis    URINE CULTURE(NEW)      2. Tension headache  ketorolac (Toradol) 15 MG/ML injection 15 mg    ondansetron (ZOFRAN ODT) 4 MG TABLET DISPERSIBLE        After assessment is appear the patient may be suffering from a tension headache and has not been relieved with her migraine medications.  Patient also does have significant nausea associated with headache.  Patient did also describe some mild  burning with urination and low back pain.  Patient had no other significant findings on examination a UA in office showed no significant abnormalities.  At this time urine was sent for culture.  Patient was provided 50 mg of IM Toradol which has helped with her headaches in the past.  For patient's nausea I did send Zofran to her pharmacy.  Patient instructed take as prescribed.  Patient instructed to continue use of resting along with increased fluids and over-the-counter analgesics as needed for discomfort.  Patient instructed to monitor for any worsening signs and symptoms of any other concerns she was instructed return to urgent care for reevaluation.    Differential diagnosis, natural history, and supportive care discussed. We also reviewed side effects of medication including allergic response, GI upset, tendon injury, rash, sedation etc. Patient and/or guardian voices understanding.      Advised the patient to follow-up with the primary care physician for recheck, reevaluation, and consideration of further management.    Please note that this dictation was created using voice recognition software. I have made every reasonable attempt to correct obvious errors, but I expect that there are errors of grammar and possibly content that I did not discover before finalizing the note.    This note was electronically signed by GIOVANA Arriaga

## 2025-04-15 LAB
BACTERIA UR CULT: NORMAL
SIGNIFICANT IND 70042: NORMAL
SITE SITE: NORMAL
SOURCE SOURCE: NORMAL

## 2025-04-18 ENCOUNTER — RESULTS FOLLOW-UP (OUTPATIENT)
Dept: URGENT CARE | Facility: PHYSICIAN GROUP | Age: 55
End: 2025-04-18

## (undated) DEVICE — SODIUM CHL. IRRIGATION 0.9% 3000ML (4EA/CA 65CA/PF)

## (undated) DEVICE — CANNULA O2 COMFORT SOFT EAR ADULT 7 FT TUBING (50/CA)

## (undated) DEVICE — DERMABOND ADVANCED - (12EA/BX)

## (undated) DEVICE — GLOVE BIOGEL SZ 7 SURGICAL PF LTX - (50PR/BX 4BX/CA)

## (undated) DEVICE — LACTATED RINGERS INJ 1000 ML - (14EA/CA 60CA/PF)

## (undated) DEVICE — GLOVE BIOGEL PI INDICATOR SZ 7.5 SURGICAL PF LF -(50/BX 4BX/CA)

## (undated) DEVICE — SUTURE 2-0 VICRYL PLUS SH - 27 INCH (36/BX)

## (undated) DEVICE — MASK OXYGEN VNYL ADLT MED CONC WITH 7 FOOT TUBING  - (50EA/CA)

## (undated) DEVICE — GLOVE BIOGEL SZ 7.5 SURGICAL PF LTX - (50PR/BX 4BX/CA)

## (undated) DEVICE — SUTURE 3-0 PROLENE PS-1 (12PK/BX)

## (undated) DEVICE — CANISTER SUCTION RIGID RED 1500CC (40EA/CA)

## (undated) DEVICE — Device

## (undated) DEVICE — SET IRRIGATION CYSTOSCOPY TUBE L80 IN (20EA/CA)

## (undated) DEVICE — SCRUB SOLUTION EXIDINE 4% 40Z - 48/CS CHLORAHEXADINE GLUCONATE

## (undated) DEVICE — TRAY FOLEY CATHETER STATLOCK 16FR SURESTEP  (10EA/CA)

## (undated) DEVICE — CLOSURE SKIN STRIP 1/2 X 4 IN - (STERI STRIP) (50/BX 4BX/CA)

## (undated) DEVICE — SUCTION INSTRUMENT YANKAUER BULBOUS TIP W/O VENT (50EA/CA)

## (undated) DEVICE — SUTURE GENERAL

## (undated) DEVICE — TUBE CONNECTING SUCTION - CLEAR PLASTIC STERILE 72 IN (50EA/CA)

## (undated) DEVICE — DRAPE LARGE 3 QUARTER - (20/CA)

## (undated) DEVICE — WATER IRRIGATION STERILE 1000ML (12EA/CA)

## (undated) DEVICE — GOWN WARMING STANDARD FLEX - (30/CA)

## (undated) DEVICE — TOWEL STOP TIMEOUT SAFETY FLAG (40EA/CA)

## (undated) DEVICE — COVER LIGHT HANDLE FLEXIBLE - SOFT (2EA/PK 80PK/CA)

## (undated) DEVICE — TUBING CLEARLINK DUO-VENT - C-FLO (48EA/CA)

## (undated) DEVICE — SENSOR OXIMETER ADULT SPO2 RD SET (20EA/BX)

## (undated) DEVICE — GLOVE BIOGEL INDICATOR SZ 7.5 SURGICAL PF LTX - (50PR/BX 4BX/CA)

## (undated) DEVICE — DRAPE U SPLIT IMP 54 X 76 - (24/CA)

## (undated) DEVICE — PACK ARTHROSCOPY - (2EA//CA)

## (undated) DEVICE — KIT  I.V. START (100EA/CA)

## (undated) DEVICE — MASK AIRWAY SIZE 3 UNIQUE SILICON (10/BX)

## (undated) DEVICE — SODIUM CHL IRRIGATION 0.9% 1000ML (12EA/CA)

## (undated) DEVICE — PAD SANITARY 11IN MAXI IND WRAPPED  (12EA/PK 24PK/CA)

## (undated) DEVICE — HUMID-VENT HEAT AND MOISTURE EXCHANGE- (50/BX)

## (undated) DEVICE — SLEEVE VASO CALF MED - (10PR/CA)

## (undated) DEVICE — CANISTER SUCTION 3000ML MECHANICAL FILTER AUTO SHUTOFF MEDI-VAC NONSTERILE LF DISP  (40EA/CA)

## (undated) DEVICE — SUTURE 2-0 VICRYL PLUS SH - 8 X 18 INCH (12/BX)

## (undated) DEVICE — SET LEADWIRE 5 LEAD BEDSIDE DISPOSABLE ECG (1SET OF 5/EA)

## (undated) DEVICE — ELECTRODE DUAL RETURN W/ CORD - (50/PK)

## (undated) DEVICE — PACK KNEE ARTHROSCOPY SM OR - (2EA/CA)